# Patient Record
Sex: MALE | Race: WHITE | HISPANIC OR LATINO
[De-identification: names, ages, dates, MRNs, and addresses within clinical notes are randomized per-mention and may not be internally consistent; named-entity substitution may affect disease eponyms.]

---

## 2017-10-25 PROBLEM — Z00.00 ENCOUNTER FOR PREVENTIVE HEALTH EXAMINATION: Status: ACTIVE | Noted: 2017-10-25

## 2017-10-26 ENCOUNTER — APPOINTMENT (OUTPATIENT)
Dept: SURGERY | Facility: CLINIC | Age: 62
End: 2017-10-26
Payer: COMMERCIAL

## 2017-10-26 VITALS — BODY MASS INDEX: 37.77 KG/M2 | HEIGHT: 66 IN | WEIGHT: 235 LBS

## 2017-10-26 DIAGNOSIS — E66.09 OTHER OBESITY DUE TO EXCESS CALORIES: ICD-10-CM

## 2017-10-26 DIAGNOSIS — K40.90 UNILATERAL INGUINAL HERNIA, W/OUT OBSTRUCTION OR GANGRENE, NOT SPECIFIED AS RECURRENT: ICD-10-CM

## 2017-10-26 DIAGNOSIS — K42.9 UMBILICAL HERNIA W/OUT OBSTRUCTION OR GANGRENE: ICD-10-CM

## 2017-10-26 DIAGNOSIS — M62.08 SEPARATION OF MUSCLE (NONTRAUMATIC), OTHER SITE: ICD-10-CM

## 2017-10-26 PROCEDURE — 99243 OFF/OP CNSLTJ NEW/EST LOW 30: CPT

## 2018-11-24 ENCOUNTER — INPATIENT (INPATIENT)
Facility: HOSPITAL | Age: 63
LOS: 1 days | Discharge: HOME | End: 2018-11-26
Attending: HOSPITALIST | Admitting: HOSPITALIST
Payer: COMMERCIAL

## 2018-11-24 VITALS
TEMPERATURE: 98 F | HEART RATE: 109 BPM | RESPIRATION RATE: 18 BRPM | OXYGEN SATURATION: 98 % | SYSTOLIC BLOOD PRESSURE: 141 MMHG | DIASTOLIC BLOOD PRESSURE: 88 MMHG

## 2018-11-24 LAB
ALBUMIN SERPL ELPH-MCNC: 4.6 G/DL — SIGNIFICANT CHANGE UP (ref 3.5–5.2)
ALP SERPL-CCNC: 106 U/L — SIGNIFICANT CHANGE UP (ref 30–115)
ALT FLD-CCNC: 18 U/L — SIGNIFICANT CHANGE UP (ref 0–41)
ANION GAP SERPL CALC-SCNC: 15 MMOL/L — HIGH (ref 7–14)
APPEARANCE UR: CLEAR — SIGNIFICANT CHANGE UP
AST SERPL-CCNC: 19 U/L — SIGNIFICANT CHANGE UP (ref 0–41)
BASOPHILS # BLD AUTO: 0.02 K/UL — SIGNIFICANT CHANGE UP (ref 0–0.2)
BASOPHILS NFR BLD AUTO: 0.2 % — SIGNIFICANT CHANGE UP (ref 0–1)
BILIRUB DIRECT SERPL-MCNC: <0.2 MG/DL — SIGNIFICANT CHANGE UP (ref 0–0.2)
BILIRUB INDIRECT FLD-MCNC: >0.1 MG/DL — LOW (ref 0.2–1.2)
BILIRUB SERPL-MCNC: 0.3 MG/DL — SIGNIFICANT CHANGE UP (ref 0.2–1.2)
BILIRUB UR-MCNC: NEGATIVE — SIGNIFICANT CHANGE UP
BUN SERPL-MCNC: 21 MG/DL — HIGH (ref 10–20)
CALCIUM SERPL-MCNC: 9.9 MG/DL — SIGNIFICANT CHANGE UP (ref 8.5–10.1)
CHLORIDE SERPL-SCNC: 97 MMOL/L — LOW (ref 98–110)
CO2 SERPL-SCNC: 26 MMOL/L — SIGNIFICANT CHANGE UP (ref 17–32)
COLOR SPEC: YELLOW — SIGNIFICANT CHANGE UP
CREAT SERPL-MCNC: 0.9 MG/DL — SIGNIFICANT CHANGE UP (ref 0.7–1.5)
DIFF PNL FLD: NEGATIVE — SIGNIFICANT CHANGE UP
EOSINOPHIL # BLD AUTO: 0.23 K/UL — SIGNIFICANT CHANGE UP (ref 0–0.7)
EOSINOPHIL NFR BLD AUTO: 2.6 % — SIGNIFICANT CHANGE UP (ref 0–8)
GLUCOSE SERPL-MCNC: 102 MG/DL — HIGH (ref 70–99)
GLUCOSE UR QL: NEGATIVE — SIGNIFICANT CHANGE UP
HCT VFR BLD CALC: 40.4 % — LOW (ref 42–52)
HGB BLD-MCNC: 13.7 G/DL — LOW (ref 14–18)
IMM GRANULOCYTES NFR BLD AUTO: 0.5 % — HIGH (ref 0.1–0.3)
KETONES UR-MCNC: NEGATIVE — SIGNIFICANT CHANGE UP
LACTATE SERPL-SCNC: 1.1 MMOL/L — SIGNIFICANT CHANGE UP (ref 0.5–2.2)
LEUKOCYTE ESTERASE UR-ACNC: NEGATIVE — SIGNIFICANT CHANGE UP
LIDOCAIN IGE QN: 21 U/L — SIGNIFICANT CHANGE UP (ref 7–60)
LYMPHOCYTES # BLD AUTO: 2.06 K/UL — SIGNIFICANT CHANGE UP (ref 1.2–3.4)
LYMPHOCYTES # BLD AUTO: 23.7 % — SIGNIFICANT CHANGE UP (ref 20.5–51.1)
MAGNESIUM SERPL-MCNC: 1.9 MG/DL — SIGNIFICANT CHANGE UP (ref 1.8–2.4)
MCHC RBC-ENTMCNC: 27.9 PG — SIGNIFICANT CHANGE UP (ref 27–31)
MCHC RBC-ENTMCNC: 33.9 G/DL — SIGNIFICANT CHANGE UP (ref 32–37)
MCV RBC AUTO: 82.3 FL — SIGNIFICANT CHANGE UP (ref 80–94)
MONOCYTES # BLD AUTO: 0.47 K/UL — SIGNIFICANT CHANGE UP (ref 0.1–0.6)
MONOCYTES NFR BLD AUTO: 5.4 % — SIGNIFICANT CHANGE UP (ref 1.7–9.3)
NEUTROPHILS # BLD AUTO: 5.86 K/UL — SIGNIFICANT CHANGE UP (ref 1.4–6.5)
NEUTROPHILS NFR BLD AUTO: 67.6 % — SIGNIFICANT CHANGE UP (ref 42.2–75.2)
NITRITE UR-MCNC: NEGATIVE — SIGNIFICANT CHANGE UP
NRBC # BLD: 0 /100 WBCS — SIGNIFICANT CHANGE UP (ref 0–0)
PH UR: 5.5 — SIGNIFICANT CHANGE UP (ref 5–8)
PLATELET # BLD AUTO: 261 K/UL — SIGNIFICANT CHANGE UP (ref 130–400)
POTASSIUM SERPL-MCNC: 4.5 MMOL/L — SIGNIFICANT CHANGE UP (ref 3.5–5)
POTASSIUM SERPL-SCNC: 4.5 MMOL/L — SIGNIFICANT CHANGE UP (ref 3.5–5)
PROT SERPL-MCNC: 7.8 G/DL — SIGNIFICANT CHANGE UP (ref 6–8)
PROT UR-MCNC: ABNORMAL
RBC # BLD: 4.91 M/UL — SIGNIFICANT CHANGE UP (ref 4.7–6.1)
RBC # FLD: 12.6 % — SIGNIFICANT CHANGE UP (ref 11.5–14.5)
SODIUM SERPL-SCNC: 138 MMOL/L — SIGNIFICANT CHANGE UP (ref 135–146)
SP GR SPEC: >=1.03 — SIGNIFICANT CHANGE UP (ref 1.01–1.03)
TROPONIN T SERPL-MCNC: <0.01 NG/ML — SIGNIFICANT CHANGE UP
UROBILINOGEN FLD QL: 0.2 — SIGNIFICANT CHANGE UP (ref 0.2–0.2)
WBC # BLD: 8.68 K/UL — SIGNIFICANT CHANGE UP (ref 4.8–10.8)
WBC # FLD AUTO: 8.68 K/UL — SIGNIFICANT CHANGE UP (ref 4.8–10.8)

## 2018-11-24 RX ORDER — KETOROLAC TROMETHAMINE 30 MG/ML
30 SYRINGE (ML) INJECTION ONCE
Qty: 0 | Refills: 0 | Status: DISCONTINUED | OUTPATIENT
Start: 2018-11-24 | End: 2018-11-24

## 2018-11-24 RX ORDER — METHOCARBAMOL 500 MG/1
2 TABLET, FILM COATED ORAL
Qty: 20 | Refills: 0 | OUTPATIENT
Start: 2018-11-24

## 2018-11-24 RX ORDER — METHOCARBAMOL 500 MG/1
1000 TABLET, FILM COATED ORAL ONCE
Qty: 0 | Refills: 0 | Status: COMPLETED | OUTPATIENT
Start: 2018-11-24 | End: 2018-11-24

## 2018-11-24 RX ADMIN — Medication 30 MILLIGRAM(S): at 20:02

## 2018-11-24 RX ADMIN — METHOCARBAMOL 1000 MILLIGRAM(S): 500 TABLET, FILM COATED ORAL at 20:02

## 2018-11-24 RX ADMIN — Medication 30 MILLIGRAM(S): at 20:30

## 2018-11-24 NOTE — ED PROVIDER NOTE - ATTENDING CONTRIBUTION TO CARE
62 y/o male with h/o CAD s/p stent, HLD in ER with c/o R sided abd pain.  Pain has been intermittent for past 2 years, but worse for past 4-6 weeks.  Pain is sharp, comes in spasms, resolves completely in between.  Pain is to R side of abdomen, R flank, at times also R testicle.  denies any testicular pain/swelling at this time.  no penile discharge.  not currently sexually active.  no dysuria/hematuria.  no n/v/d.  no f/c. no cp/sob.  no ha/dizziness/loc.    pe - nad, nc/at, eomi, perrl, op - clear, cta b/l, no w/r/r, rrr, abd - soft, + r sided abd tenderness, no guarding/rebound, no cvat, uncirc male, no penile/testicular swelling or tenderness, from x 4, A&O x 3, no focal neuro deficits.  -check labs, ua, ct abd, testicular sono, re-eval.

## 2018-11-24 NOTE — ED PROVIDER NOTE - PHYSICAL EXAMINATION
VITAL SIGNS: I have reviewed nursing notes and confirm.  CONSTITUTIONAL: Well-developed; well-nourished; +very uncomfortable, sweating. sitting down in one position with legs crossed.  SKIN: skin exam is warm and dry, no acute rash.   HEAD: Normocephalic; atraumatic.  EYES:  EOM intact; conjunctiva and sclera clear.  ENT: No nasal discharge; airway clear. moist oral mucosa;   NECK: Supple; non tender.  CARD: S1, S2 normal; no murmurs, gallops, or rubs. Regular rate and rhythm. posterior tibial and radial pulses 2+  RESP: No wheezes, rales or rhonchi. cta b/l. no use of accessory muscles. no retractions  ABD: Normal bowel sounds; soft; non-distended; +epigastric tender; no rebound. negative psoas, rovsign's and murphys.  EXT: Normal ROM. No  cyanosis or edema.  BACK: +r.  cva tenderness  LYMPH: No acute cervical adenopathy.  NEURO: Alert, oriented, grossly unremarkable.    PSYCH: Cooperative, appropriate. VITAL SIGNS: I have reviewed nursing notes and confirm.  CONSTITUTIONAL: Well-developed; well-nourished; +very uncomfortable, sweating. sitting down in one position with legs crossed.  SKIN: skin exam is warm and dry, no acute rash.   HEAD: Normocephalic; atraumatic.  EYES:  EOM intact; conjunctiva and sclera clear.  ENT: No nasal discharge; airway clear. moist oral mucosa;   NECK: Supple; non tender.  CARD: S1, S2 normal; no murmurs, gallops, or rubs. Regular rate and rhythm. posterior tibial and radial pulses 2+  RESP: No wheezes, rales or rhonchi. cta b/l. no use of accessory muscles. no retractions  ABD: Normal bowel sounds; soft; non-distended; +epigastric tender; no rebound. negative psoas, rovsign's and murphys.  : no testicular tenderness or swelling +cremaster reflex b/l  EXT: Normal ROM. No  cyanosis or edema.  BACK: +r.  cva tenderness  LYMPH: No acute cervical adenopathy.  NEURO: Alert, oriented, grossly unremarkable.    PSYCH: Cooperative, appropriate.

## 2018-11-24 NOTE — ED PROVIDER NOTE - MEDICAL DECISION MAKING DETAILS
pt in ER with c/o 4-6 week h/o R back pain, R abd pain, occasional R testicular pain. labs ok.  CT scan with multifocal osseous lytic lesions, partial pathologic destruction of T10 vertebral body c/w metastatic disease.  pt with no vertebral tenderness on pe, no neuro deficits.  neurosurg called to see pt.  pt admitted for onc w/u.

## 2018-11-24 NOTE — ED PROVIDER NOTE - PROGRESS NOTE DETAILS
Pt reports feeling improvement, but not total resolution of symptoms with toradol and robaxin.  waiting on imaging Spoke with pt about CT results--- and lytic lesions.  Pt understand.  Pt will be admitted for work up which includes neurosurg for eval, oncology and search for primary source of malignancy. neurosurg recommending MRI.  MAR notified

## 2018-11-24 NOTE — ED ADULT NURSE NOTE - NSIMPLEMENTINTERV_GEN_ALL_ED
Implemented All Universal Safety Interventions:  [place] to call system. Call bell, personal items and telephone within reach. Instruct patient to call for assistance. Room bathroom lighting operational. Non-slip footwear when patient is off stretcher. Physically safe environment: no spills, clutter or unnecessary equipment. Stretcher in lowest position, wheels locked, appropriate side rails in place.

## 2018-11-24 NOTE — ED PROVIDER NOTE - OBJECTIVE STATEMENT
62y/o M w/ hx of cholesterol, CAD (2stents) and on 64y/o M w/ hx of cholesterol, CAD (2stents) and on and off LLQ pain since 2016 which has not been figured it out (colonoscopy 8/2017 at [facility] [address]- no abnormalities although "the end was not visualized" presents for worsening of stabbing L flank pain, L. back pain, LUQ pain and L. testicular pain that has been there for 4-6 weeks-- constant but changing in intensity throughout-- only day it went away was earlier today when he was sitting not moving-- but he moved and came back up.  pain improves mildly when sitting and crossing his left leg on top of r. leg. pt has been trying icey hot.  no blood in urine, no hx of kidney stones, no cough, congestion, runny nose, no vomiting.  no sob or cp. no vomiting or diarrhea. no dysuria or freq. no fevers or chills. 62y/o M w/ hx of cholesterol, CAD (2stents) and on and off LLQ pain since 2016 which has not been figured it out (colonoscopy 8/2017 at [facility] [address]- no abnormalities although "the end was not visualized" presents for worsening of stabbing R flank pain, R. back pain, RUQ pain and R. testicular pain that has been there for 4-6 weeks-- constant but changing in intensity throughout-- only day it went away was earlier today when he was sitting not moving-- but he moved and came back up.  pain improves mildly when sitting and crossing his left leg on top of r. leg. pt has been trying icey hot.  no blood in urine, no hx of kidney stones, no cough, congestion, runny nose, no vomiting.  no sob or cp. no vomiting or diarrhea. no dysuria or freq. no fevers or chills.

## 2018-11-25 LAB
ALBUMIN SERPL ELPH-MCNC: 4.4 G/DL — SIGNIFICANT CHANGE UP (ref 3.5–5.2)
ALP SERPL-CCNC: 93 U/L — SIGNIFICANT CHANGE UP (ref 30–115)
ALT FLD-CCNC: 19 U/L — SIGNIFICANT CHANGE UP (ref 0–41)
ANION GAP SERPL CALC-SCNC: 14 MMOL/L — SIGNIFICANT CHANGE UP (ref 7–14)
AST SERPL-CCNC: 19 U/L — SIGNIFICANT CHANGE UP (ref 0–41)
BASOPHILS # BLD AUTO: 0.03 K/UL — SIGNIFICANT CHANGE UP (ref 0–0.2)
BASOPHILS NFR BLD AUTO: 0.3 % — SIGNIFICANT CHANGE UP (ref 0–1)
BILIRUB SERPL-MCNC: 0.4 MG/DL — SIGNIFICANT CHANGE UP (ref 0.2–1.2)
BUN SERPL-MCNC: 22 MG/DL — HIGH (ref 10–20)
CALCIUM SERPL-MCNC: 9.8 MG/DL — SIGNIFICANT CHANGE UP (ref 8.5–10.1)
CHLORIDE SERPL-SCNC: 98 MMOL/L — SIGNIFICANT CHANGE UP (ref 98–110)
CO2 SERPL-SCNC: 27 MMOL/L — SIGNIFICANT CHANGE UP (ref 17–32)
CREAT SERPL-MCNC: 0.9 MG/DL — SIGNIFICANT CHANGE UP (ref 0.7–1.5)
EOSINOPHIL # BLD AUTO: 0.25 K/UL — SIGNIFICANT CHANGE UP (ref 0–0.7)
EOSINOPHIL NFR BLD AUTO: 2.9 % — SIGNIFICANT CHANGE UP (ref 0–8)
GLUCOSE SERPL-MCNC: 91 MG/DL — SIGNIFICANT CHANGE UP (ref 70–99)
HCT VFR BLD CALC: 41 % — LOW (ref 42–52)
HGB BLD-MCNC: 13.7 G/DL — LOW (ref 14–18)
IMM GRANULOCYTES NFR BLD AUTO: 0.3 % — SIGNIFICANT CHANGE UP (ref 0.1–0.3)
LYMPHOCYTES # BLD AUTO: 3.26 K/UL — SIGNIFICANT CHANGE UP (ref 1.2–3.4)
LYMPHOCYTES # BLD AUTO: 37.6 % — SIGNIFICANT CHANGE UP (ref 20.5–51.1)
MAGNESIUM SERPL-MCNC: 2.1 MG/DL — SIGNIFICANT CHANGE UP (ref 1.8–2.4)
MCHC RBC-ENTMCNC: 27.6 PG — SIGNIFICANT CHANGE UP (ref 27–31)
MCHC RBC-ENTMCNC: 33.4 G/DL — SIGNIFICANT CHANGE UP (ref 32–37)
MCV RBC AUTO: 82.7 FL — SIGNIFICANT CHANGE UP (ref 80–94)
MONOCYTES # BLD AUTO: 0.58 K/UL — SIGNIFICANT CHANGE UP (ref 0.1–0.6)
MONOCYTES NFR BLD AUTO: 6.7 % — SIGNIFICANT CHANGE UP (ref 1.7–9.3)
NEUTROPHILS # BLD AUTO: 4.52 K/UL — SIGNIFICANT CHANGE UP (ref 1.4–6.5)
NEUTROPHILS NFR BLD AUTO: 52.2 % — SIGNIFICANT CHANGE UP (ref 42.2–75.2)
PLATELET # BLD AUTO: 253 K/UL — SIGNIFICANT CHANGE UP (ref 130–400)
POTASSIUM SERPL-MCNC: 4.4 MMOL/L — SIGNIFICANT CHANGE UP (ref 3.5–5)
POTASSIUM SERPL-SCNC: 4.4 MMOL/L — SIGNIFICANT CHANGE UP (ref 3.5–5)
PROT SERPL-MCNC: 7.6 G/DL — SIGNIFICANT CHANGE UP (ref 6–8)
RBC # BLD: 4.96 M/UL — SIGNIFICANT CHANGE UP (ref 4.7–6.1)
RBC # FLD: 12.8 % — SIGNIFICANT CHANGE UP (ref 11.5–14.5)
SODIUM SERPL-SCNC: 139 MMOL/L — SIGNIFICANT CHANGE UP (ref 135–146)
WBC # BLD: 8.67 K/UL — SIGNIFICANT CHANGE UP (ref 4.8–10.8)
WBC # FLD AUTO: 8.67 K/UL — SIGNIFICANT CHANGE UP (ref 4.8–10.8)

## 2018-11-25 RX ORDER — ALBUTEROL 90 UG/1
2 AEROSOL, METERED ORAL EVERY 6 HOURS
Qty: 0 | Refills: 0 | Status: DISCONTINUED | OUTPATIENT
Start: 2018-11-25 | End: 2018-11-26

## 2018-11-25 RX ORDER — ENOXAPARIN SODIUM 100 MG/ML
40 INJECTION SUBCUTANEOUS EVERY 24 HOURS
Qty: 0 | Refills: 0 | Status: DISCONTINUED | OUTPATIENT
Start: 2018-11-25 | End: 2018-11-26

## 2018-11-25 RX ORDER — ACETAMINOPHEN 500 MG
650 TABLET ORAL EVERY 4 HOURS
Qty: 0 | Refills: 0 | Status: DISCONTINUED | OUTPATIENT
Start: 2018-11-25 | End: 2018-11-26

## 2018-11-25 RX ORDER — KETOROLAC TROMETHAMINE 30 MG/ML
30 SYRINGE (ML) INJECTION EVERY 6 HOURS
Qty: 0 | Refills: 0 | Status: DISCONTINUED | OUTPATIENT
Start: 2018-11-25 | End: 2018-11-26

## 2018-11-25 RX ORDER — FOLIC ACID 0.8 MG
1 TABLET ORAL DAILY
Qty: 0 | Refills: 0 | Status: DISCONTINUED | OUTPATIENT
Start: 2018-11-25 | End: 2018-11-26

## 2018-11-25 RX ORDER — ATORVASTATIN CALCIUM 80 MG/1
0 TABLET, FILM COATED ORAL
Qty: 0 | Refills: 0 | COMMUNITY

## 2018-11-25 RX ORDER — ATORVASTATIN CALCIUM 80 MG/1
20 TABLET, FILM COATED ORAL AT BEDTIME
Qty: 0 | Refills: 0 | Status: DISCONTINUED | OUTPATIENT
Start: 2018-11-25 | End: 2018-11-26

## 2018-11-25 RX ORDER — ASPIRIN/CALCIUM CARB/MAGNESIUM 324 MG
0 TABLET ORAL
Qty: 0 | Refills: 0 | COMMUNITY

## 2018-11-25 RX ORDER — PANTOPRAZOLE SODIUM 20 MG/1
40 TABLET, DELAYED RELEASE ORAL
Qty: 0 | Refills: 0 | Status: DISCONTINUED | OUTPATIENT
Start: 2018-11-25 | End: 2018-11-26

## 2018-11-25 RX ORDER — THIAMINE MONONITRATE (VIT B1) 100 MG
100 TABLET ORAL DAILY
Qty: 0 | Refills: 0 | Status: DISCONTINUED | OUTPATIENT
Start: 2018-11-25 | End: 2018-11-26

## 2018-11-25 RX ORDER — ASPIRIN/CALCIUM CARB/MAGNESIUM 324 MG
81 TABLET ORAL DAILY
Qty: 0 | Refills: 0 | Status: DISCONTINUED | OUTPATIENT
Start: 2018-11-25 | End: 2018-11-26

## 2018-11-25 RX ADMIN — PANTOPRAZOLE SODIUM 40 MILLIGRAM(S): 20 TABLET, DELAYED RELEASE ORAL at 05:56

## 2018-11-25 RX ADMIN — ATORVASTATIN CALCIUM 20 MILLIGRAM(S): 80 TABLET, FILM COATED ORAL at 14:13

## 2018-11-25 RX ADMIN — Medication 30 MILLIGRAM(S): at 18:46

## 2018-11-25 RX ADMIN — Medication 30 MILLIGRAM(S): at 03:24

## 2018-11-25 RX ADMIN — Medication 30 MILLIGRAM(S): at 04:00

## 2018-11-25 RX ADMIN — Medication 81 MILLIGRAM(S): at 14:13

## 2018-11-25 NOTE — H&P ADULT - FAMILY HISTORY
Mother  Still living? Unknown  Family history of hyperlipidemia, Age at diagnosis: Age Unknown     Father  Still living? Unknown  Family history of coronary artery disease, Age at diagnosis: Age Unknown

## 2018-11-25 NOTE — H&P ADULT - ATTENDING COMMENTS
Patient seen and examined independently. I agree with the resident's note, physical exam, and plan except as below.  ROS: back pain n cp, sob, palp, cough, blood in urine or stool , wt loss  Vital Signs Last 24 Hrs  T(C): 36.4 (25 Nov 2018 07:10), Max: 36.7 (24 Nov 2018 18:23)  T(F): 97.5 (25 Nov 2018 07:10), Max: 98 (24 Nov 2018 18:23)  HR: 79 (25 Nov 2018 07:10) (79 - 109)  BP: 141/77 (25 Nov 2018 07:10) (141/77 - 141/88)  BP(mean): --  RR: 20 (25 Nov 2018 07:10) (18 - 20)  SpO2: 98% (25 Nov 2018 07:10) (98% - 98%)  PE  obese  nad  aaox3  [id number]  ctabl  softntnd+bs  no cce    #acute on chronic back pain - found to have osseus lytic lesions   check psa, spep/upep - will need metastatic workup and biopsy  +smoking hx 40 pyhx  no signs of etoh withdrawal  #T10 compression fracture - neurosurg consulted - pain control, dvt prophylaxis   pt would like to have initial workup done here and follow up rest as outpt - including biopsy  dispo to home depending on neurosurgery eval

## 2018-11-25 NOTE — H&P ADULT - NSHPSOCIALHISTORY_GEN_ALL_CORE
Substance Use (street drugs): (  ) never used  ( x ) other: Remote history of cocaine usage but quit many years ago. No reported IVDA    Tobacco Usage:  (   ) never smoked   ( x  ) former smoker   (   ) current smoker: 2ppd >40 years   Alcohol Usage: 10 20oz beers eery weekend

## 2018-11-25 NOTE — H&P ADULT - NSHPPHYSICALEXAM_GEN_ALL_CORE
GENERAL: NAD, obese, Non-toxic, stated age   HEAD:  Atraumatic, Normocephalic  EYES: EOMI, conjunctiva and sclera clear  CHEST/LUNG: Clear to auscultation bilaterally; No wheezing, rhonchi, or crackles  HEART: Regular rate and rhythm; s1, s2, No murmurs, rubs, or gallops  ABDOMEN: Soft, Nontender, Nondistended; Bowel sounds present, No rebound or guarding noted   EXTREMITIES:  No lower extremity edema or calf tenderness to palpation.  No clubbing or cyanosis  PSYCH: AAOx3  NEUROLOGY: non-focal  SKIN: No rashes or lesions

## 2018-11-25 NOTE — H&P ADULT - HISTORY OF PRESENT ILLNESS
63M with pmhx of CAD s/p Stent (~2013), and HLD, and COPD (stable, takes no medications) presents from home for back pain x 6 weeks. Patient states the back pain varies in intensity and location but noted in his mid back, lower back, right side, and radiating down to his right testicle. Patient states the pain was more pronounced at night and when laying down, different positions helped alleviate to the pain. Denied any urinary incontinence, falls, unintended weight loss, night sweats, family history of cancer, CP, SOB, palpitations, saddle anesthesia/numbness or weakness of the lower extremities.   Patient reports his last colo was ~Aug 2017 and was normal though there was a point which could not be well visualized. He states no other abnormalities were reported to him.   Patient does report a heavy smoking history, ~2ppd for 40 years though quit ~5 years ago. Additionally he has had sustain heavy alcohol usage over the last 20 years, currently down to ~10 22oz beers on weekends.  Last drink was 4 days PTP. No history of IV drug abuse     In the ED patient underwent CT a/p to assess etiology of back pain and was found to have compression fracture at T10 with multiple lytic bone lesions suspicious for metastatic cancer. Primary site of cancer is unclear from the imaging.

## 2018-11-25 NOTE — H&P ADULT - ASSESSMENT
63M with pmhx of CAD s/p Stent (~2013), and HLD, and COPD (stable, takes no medications) presents from home for back pain x 6 weeks.  Found to have multiple lytic bone lesions on CT scan concerning or malignancy    Back Pain 2/2 to lytic bone lesions, suspicious for Metastatic Cancer: Primary source is unclear at this time   Patient will require CT Chest with IV contrast to further assess involvement of metastatic disease and possible biopsy points. Order in one day (just had IV contrast)   Bone scan   Neuro Sx Consulted for compression fracture, though no evidence of cord compression at this time, will hold on steroids. May be possible for them to biopsy spinal lesions    Likely to require IR guided biopsy once imaging is complete   Hold off on Heme-Onc consult until preliminary work up is complete, ideally biopsy should be taken   Will need repeat cancer screening as out patient (colonoscopy), please attempt to get previous colo report from out patient doctors.   Ca levels are normal, will wait on cancer screening markers until imaging is done. No clear source at this time   Toradol and tylenol for back pain     EtOH abuse: CIWA is 0 at time of admission  Ativan PRN if needed    GI ppx: will start PPI given ASA, Toradol, and Lovenox   DVT ppx: Lovenox sq  Full Code   Activity As tolerated  Diet: Low fat 63M with pmhx of CAD s/p Stent (~2013), and HLD, and COPD (stable, takes no medications) presents from home for back pain x 6 weeks.  Found to have multiple lytic bone lesions on CT scan concerning or malignancy    Back Pain 2/2 to lytic bone lesions, suspicious for Metastatic Cancer: Primary source is unclear at this time   Patient will require CT Chest with IV contrast to further assess involvement of metastatic disease and possible biopsy points. Order in one day (just had IV contrast)   Bone scan   Neuro Sx Consulted for compression fracture, though no evidence of cord compression at this time, will hold on steroids. May be possible for them to biopsy spinal lesions    Likely to require IR guided biopsy once imaging is complete   Hold off on Heme-Onc consult until preliminary work up is complete, ideally biopsy should be taken   Will need repeat cancer screening as out patient (colonoscopy), please attempt to get previous colo report from out patient doctors.   Ca levels are normal, will wait on cancer screening markers until imaging is done. No clear source at this time   Toradol and tylenol for back pain     EtOH abuse: CIWA is 0 at time of admission  Ativan PRN if needed  Folate and thiamine     CAD s/p Stent:   Cont with ASA and Liptior  Unclear as to why the patient isn't on BB and ACE-I/ARB, follows with Dr. Pastor as an out patient    GI ppx: will start PPI given ASA, Toradol, and Lovenox   DVT ppx: Lovenox sq  Full Code   Activity As tolerated  Diet: Low fat

## 2018-11-25 NOTE — H&P ADULT - NSHPLABSRESULTS_GEN_ALL_CORE
13.7   8.68  )-----------( 261      ( 24 Nov 2018 20:00 )             40.4       11-24    138  |  97<L>  |  21<H>  ----------------------------<  102<H>  4.5   |  26  |  0.9    Ca    9.9      24 Nov 2018 20:00  Mg     1.9     11-24    TPro  7.8  /  Alb  4.6  /  TBili  0.3  /  DBili  <0.2  /  AST  19  /  ALT  18  /  AlkPhos  106  11-24              Urinalysis Basic - ( 24 Nov 2018 21:05 )    Color: Yellow / Appearance: Clear / SG: >=1.030 / pH: x  Gluc: x / Ketone: Negative  / Bili: Negative / Urobili: 0.2   Blood: x / Protein: Trace / Nitrite: Negative   Leuk Esterase: Negative / RBC: x / WBC x   Sq Epi: x / Non Sq Epi: x / Bacteria: x    Lactate Trend  11-24 @ 20:00 Lactate:1.1       CARDIAC MARKERS ( 24 Nov 2018 20:00 )  x     / <0.01 ng/mL / x     / x     / x

## 2018-11-26 ENCOUNTER — TRANSCRIPTION ENCOUNTER (OUTPATIENT)
Age: 63
End: 2018-11-26

## 2018-11-26 VITALS
DIASTOLIC BLOOD PRESSURE: 71 MMHG | RESPIRATION RATE: 18 BRPM | HEART RATE: 67 BPM | TEMPERATURE: 97 F | SYSTOLIC BLOOD PRESSURE: 116 MMHG

## 2018-11-26 LAB
CULTURE RESULTS: NO GROWTH — SIGNIFICANT CHANGE UP
SPECIMEN SOURCE: SIGNIFICANT CHANGE UP

## 2018-11-26 PROCEDURE — 99221 1ST HOSP IP/OBS SF/LOW 40: CPT

## 2018-11-26 RX ORDER — INFLUENZA VIRUS VACCINE 15; 15; 15; 15 UG/.5ML; UG/.5ML; UG/.5ML; UG/.5ML
0.5 SUSPENSION INTRAMUSCULAR ONCE
Qty: 0 | Refills: 0 | Status: COMPLETED | OUTPATIENT
Start: 2018-11-26 | End: 2018-11-26

## 2018-11-26 RX ORDER — PANTOPRAZOLE SODIUM 20 MG/1
1 TABLET, DELAYED RELEASE ORAL
Qty: 30 | Refills: 0 | OUTPATIENT
Start: 2018-11-26 | End: 2018-12-25

## 2018-11-26 RX ORDER — OXYCODONE AND ACETAMINOPHEN 5; 325 MG/1; MG/1
1 TABLET ORAL EVERY 4 HOURS
Qty: 0 | Refills: 0 | Status: DISCONTINUED | OUTPATIENT
Start: 2018-11-26 | End: 2018-11-26

## 2018-11-26 RX ADMIN — Medication 650 MILLIGRAM(S): at 03:15

## 2018-11-26 RX ADMIN — Medication 500 MILLIGRAM(S): at 14:45

## 2018-11-26 RX ADMIN — Medication 650 MILLIGRAM(S): at 19:43

## 2018-11-26 RX ADMIN — Medication 500 MILLIGRAM(S): at 14:01

## 2018-11-26 NOTE — DISCHARGE NOTE ADULT - CARE PLAN
Principal Discharge DX:	Back pain  Goal:	Follow up for further imaging to establish primary diagnosis and cause of back pain  Assessment and plan of treatment:	Follow up as outpatient for diagnosis and further direction and need for biopsy and further treatment.

## 2018-11-26 NOTE — DISCHARGE NOTE ADULT - PLAN OF CARE
Follow up for further imaging to establish primary diagnosis and cause of back pain Follow up as outpatient for diagnosis and further direction and need for biopsy and further treatment.

## 2018-11-26 NOTE — DISCHARGE NOTE ADULT - PROVIDER TOKENS
FREE:[LAST:[Lupe],FIRST:[Elizabeth],PHONE:[phone]],FAX:[(   )    -],ADDRESS:[[address] [place] [address]]]

## 2018-11-26 NOTE — PROVIDER CONTACT NOTE (OTHER) - ACTION/TREATMENT ORDERED:
as per md will cancel d/c for now; when pt returns from test if he wishes to be d/c'd md will re-order.

## 2018-11-26 NOTE — DISCHARGE NOTE ADULT - HOSPITAL COURSE
63M PMH CAD s/p Stent (2013), HLD, COPD (stable, takes no medications) presents from home for back pain x 6 weeks. Found to have multiple lytic bone lesions on CT scan concerning for malignancy  LBack Pain 2/2 to lytic bone lesions, suspicious for Metastatic Cancer: Primary source is unclear at this time   CT Chest with IV contrast done and not read.   Bone scan as outpatient   Will Require IR guided biopsy once imaging is complete - but pt wants to do this as outpt  Heme onc likely after tissue diagnosis   CEA, PSA, SPEP/UPEP (though doubt MM) All pending.   Back pain controlled by  naprosyn and PPI for GI protection and  percocet 5/325 po q4 prn severe pain     from: CT Abdomen and Pelvis w/ IV Cont (11.24.18 @ 22:55) >  IMPRESSION:      Multifocal osseous lytic lesions as described with associated partial   pathologic destruction of the T10 vertebral body consistent with   metastatic disease. The underlying etiology is uncertain.    Further evaluation with nuclear medicine bone scan to evaluate extent of   disease is recommended. Additionally contrast-enhanced MR imaging of the   thoracic spine may be of benefit.    Mild prostatomegaly.  CAD s/p Stent:   Cont with ASA and Liptior  follow up with Dr. Pastor as an out patient 63M PMH CAD s/p Stent (2013), HLD, COPD (stable, takes no medications) presents from home for back pain x 6 weeks. Found to have multiple lytic bone lesions on CT scan concerning for malignancy  LBack Pain 2/2 to lytic bone lesions, suspicious for Metastatic Cancer: Primary source is unclear at this time   CT Chest with IV contrast done and not read. Pt insisted on discharge prior to completion of workup and test results with strict follow up instruction with PCP Dr. Elizabeth Andrade  Bone scan as outpatient   Will Require IR guided biopsy once imaging is complete - but pt wants to do this as outpt  Heme onc likely after tissue diagnosis   CEA, PSA, SPEP/UPEP (though doubt MM) All pending.   Back pain controlled by  naprosyn and PPI for GI protection and  percocet 5/325 po q4 prn severe pain     from: CT Abdomen and Pelvis w/ IV Cont (11.24.18 @ 22:55) >  IMPRESSION:      Multifocal osseous lytic lesions as described with associated partial   pathologic destruction of the T10 vertebral body consistent with   metastatic disease. The underlying etiology is uncertain.    Further evaluation with nuclear medicine bone scan to evaluate extent of   disease is recommended. Additionally contrast-enhanced MR imaging of the   thoracic spine may be of benefit.    Mild prostatomegaly.  CAD s/p Stent:   Cont with ASA and Liptior  follow up with Dr. Pastor as an out patient

## 2018-11-26 NOTE — DISCHARGE NOTE ADULT - PATIENT PORTAL LINK FT
You can access the [facility] Portal, offered by [facility], by registering with the following website: [email or web address]

## 2018-11-26 NOTE — CONSULT NOTE ADULT - SUBJECTIVE AND OBJECTIVE BOX
HISTORY OF PRESENT ILLNESS: 63M with pmhx of CAD s/p Stent (~2013), and HLD, and COPD (stable, takes no medications) presents from home for back pain x 6 weeks. Patient states the back pain varies in intensity and location but noted in his mid back, lower back, right side, and radiating down to his right testicle. Patient states the pain was more pronounced at night and when laying down, different positions helped alleviate to the pain. Denied any urinary incontinence, falls, unintended weight loss, night sweats, family history of cancer, CP, SOB, palpitations, saddle anesthesia/numbness or weakness of the lower extremities.   Patient reports his last colo was ~Aug 2017 and was normal though there was a point which could not be well visualized. He states no other abnormalities were reported to him.   Patient does report a heavy smoking history, ~2ppd for 40 years though quit ~5 years ago. Additionally he has had sustain heavy alcohol usage over the last 20 years, currently down to ~10 22oz beers on weekends.  Last drink was 4 days PTP. No history of IV drug abuse     In the ED patient underwent CT a/p to assess etiology of back pain and was found to have compression fracture at T10 with multiple lytic bone lesions suspicious for metastatic cancer. Primary site of cancer is unclear from the imaging. No significant cord compression noted on CT 9though exam of the central canal is limited in this modality).    CT C/A/P with contrast significant only for mild enlargement of prostate, Lytic lesions in T10, T8, rib and iliac crest. No other lesions. MRI +/- gado of the thoracic spine pending, Nuclear med scan pending. Labs pending (PSA, MM etc). Pt is up and ambulating without difficulty. Per report pain is not too severe. Pt not in room on MDs attempt to see him this evening for consultation.     PAST MEDICAL & SURGICAL HISTORY:  Alcohol abuse  Stented coronary artery  COPD (chronic obstructive pulmonary disease)  CAD (coronary artery disease)  High cholesterol  No significant past surgical history    FAMILY HISTORY:  Family history of coronary artery disease (Father)  Family history of hyperlipidemia (Mother)      SOCIAL HISTORY:  Tobacco Use:  EtOH use:   Substance:    Allergies    No Known Allergies    Intolerances        REVIEW OF SYSTEMS  unable to obtain, pt not in room 	      MEDICATIONS:  Antibiotics:    Neuro:  acetaminophen   Tablet .. 650 milliGRAM(s) Oral every 4 hours PRN  naproxen 500 milliGRAM(s) Oral every 12 hours  oxyCODONE    5 mG/acetaminophen 325 mG 1 Tablet(s) Oral every 4 hours PRN    Anticoagulation:  aspirin enteric coated 81 milliGRAM(s) Oral daily  enoxaparin Injectable 40 milliGRAM(s) SubCutaneous every 24 hours    OTHER:  ALBUTerol    90 MICROgram(s) HFA Inhaler 2 Puff(s) Inhalation every 6 hours PRN  atorvastatin 20 milliGRAM(s) Oral at bedtime  pantoprazole    Tablet 40 milliGRAM(s) Oral before breakfast    IVF:      Vital Signs Last 24 Hrs  T(C): 36.2 (26 Nov 2018 14:18), Max: 36.2 (26 Nov 2018 14:18)  T(F): 97.2 (26 Nov 2018 14:18), Max: 97.2 (26 Nov 2018 14:18)  HR: 67 (26 Nov 2018 14:18) (67 - 99)  BP: 116/71 (26 Nov 2018 14:18) (116/71 - 165/83)  BP(mean): --  RR: 18 (26 Nov 2018 14:18) (18 - 18)  SpO2: --    PHYSICAL EXAM:  Per report pt with mild to moderate back pain. No focal neurologic deficits. Ambulating wihtout difficult (MD unable to examine pt as he was not in room on rounds)    LABS:                        13.7   8.67  )-----------( 253      ( 25 Nov 2018 08:22 )             41.0     11-25    139  |  98  |  22<H>  ----------------------------<  91  4.4   |  27  |  0.9    Ca    9.8      25 Nov 2018 08:22  Mg     2.1     11-25    TPro  7.6  /  Alb  4.4  /  TBili  0.4  /  DBili  x   /  AST  19  /  ALT  19  /  AlkPhos  93  11-25      Urinalysis Basic - ( 24 Nov 2018 21:05 )    Color: Yellow / Appearance: Clear / SG: >=1.030 / pH: x  Gluc: x / Ketone: Negative  / Bili: Negative / Urobili: 0.2   Blood: x / Protein: Trace / Nitrite: Negative   Leuk Esterase: Negative / RBC: x / WBC x   Sq Epi: x / Non Sq Epi: x / Bacteria: x      CULTURES:  Culture Results:   No growth (11-24 @ 21:05)      RADIOLOGY & ADDITIONAL STUDIES:    Assessment: Multiple klytic lesions in spine, ribs and lilac crest with associated T10 compression fracture. Pt with no known history of primary malignancy.      Plan:    Agree with current workup. Please obtain MRI +/- thoracic spine. No neurosurgical intervention indicated urgently in this pt without clinical evidence of cord compression. will continue to follow.

## 2018-11-26 NOTE — PROGRESS NOTE ADULT - SUBJECTIVE AND OBJECTIVE BOX
DAILY PROGRESS NOTE  ===========================================================  Patient Information:   Hospital Day:</KIKA BOUDREAUX  /  63y  /  Male  /b> 1d /  MRN#: [id number]  Working / Admitting Diagnosis:  1. Back pain 2/2 lytic lesions    |:::::::::::::::::::::::::::::| SUBJECTIVE |:::::::::::::::::::::::::::::::|  OVERNIGHT EVENTS:   No acute events noted. Back pain improved.   Denies chest pain / SOB / palpitations / Nausea / Vomiting / constipation / diarrhea or abdominal pain.   ROS otherwise negative.    Past Medical History:   Family history of coronary artery disease (Father)  Family history of hyperlipidemia (Mother)  Alcohol abuse  Stented coronary artery  COPD / CAD / High cholesterol    ALLERGIES:  No Known Allergies    HOME MEDICATIONS:  aspirin 81 mg oral delayed release tablet: 1 tab(s) orally once a day (25 Nov 2018 03:02)  Lipitor 20 mg oral tablet: 1 tab(s) orally once a day (25 Nov 2018 03:02)    |:::::::::::::::::::::::::::| OBJECTIVE |:::::::::::::::::::::::::::|    VITAL SIGNS: Last 24 Hours  T(C): 35.9 (26 Nov 2018 04:17), Max: 35.9 (26 Nov 2018 04:17)  T(F): 96.6 (26 Nov 2018 04:17), Max: 96.6 (26 Nov 2018 04:17)  HR: 99 (26 Nov 2018 04:17) (71 - 99)  BP: 165/83 (26 Nov 2018 04:17) (108/62 - 165/83)  RR: 18 (26 Nov 2018 04:17) (18 - 18)  SpO2: 98% (25 Nov 2018 15:27) (98% - 98%)    PHYSICAL EXAM:  GENERAL:   Awake, alert; NAD.  HEENT:  Head NC/AT; Conjunctivae pink, Sclera anicteric & non-injected; Oral mucosa moist.  NECK:   Supple.  CARDIO:   RRR; S1 & S2 audible  RESP:  No respiratory distress or accessory muscle use. CTAB  GI:   Soft/ NT/ND / No guarding; No rebound tenderness.  EXT:   Without any cyanosis, clubbing, rash, lesions or edema.     LAB RESULTS:                        13.7   8.67  )-----------( 253      ( 25 Nov 2018 08:22 )             41.0     11-25    139  |  98  |  22<H>  ----------------------------<  91  4.4   |  27  |  0.9    Ca    9.8      25 Nov 2018 08:22  Mg     2.1     11-25    TPro  7.6  /  Alb  4.4  /  TBili  0.4  /  DBili  x   /  AST  19  /  ALT  19  /  AlkPhos  93  11-25  Urinalysis Basic - ( 24 Nov 2018 21:05 )  Color: Yellow / Appearance: Clear / SG: >=1.030 / pH: x  Gluc: x / Ketone: Negative  / Bili: Negative / Urobili: 0.2   Blood: x / Protein: Trace / Nitrite: Negative   Leuk Esterase: Negative / RBC: x / WBC x   Sq Epi: x / Non Sq Epi: x / Bacteria: x  CARDIAC MARKERS ( 24 Nov 2018 20:00 )  x     / <0.01 ng/mL / x     / x     / x        RADIOLOGY:  < from: CT Abdomen and Pelvis w/ IV Cont (11.24.18 @ 22:55) > IMPRESSION:      Multifocal osseous lytic lesions as described with associated partial   pathologic destruction of the T10 vertebral body consistent with   metastatic disease. The underlying etiology is uncertain.    Further evaluation with nuclear medicine bone scan to evaluate extent of   disease is recommended. Additionally contrast-enhanced MR imaging of the   thoracic spine may be of benefit.  Mild prostatomegaly.    INPATIENT MEDICATIONS:  aspirin enteric coated 81 milliGRAM(s) Oral daily  atorvastatin 20 milliGRAM(s) Oral at bedtime  enoxaparin Injectable 40 milliGRAM(s) SubCutaneous every 24 hours  folic acid 1 milliGRAM(s) Oral daily  naproxen 500 milliGRAM(s) Oral every 12 hours  pantoprazole    Tablet 40 milliGRAM(s) Oral before breakfast  thiamine 100 milliGRAM(s) Oral daily    PRN MEDICATIONS  acetaminophen   Tablet .. 650 milliGRAM(s) Oral every 4 hours PRN  ALBUTerol    90 MICROgram(s) HFA Inhaler 2 Puff(s) Inhalation every 6 hours PRN    ------------------------------------------------------------------------------------------------------------

## 2018-11-26 NOTE — PROGRESS NOTE ADULT - SUBJECTIVE AND OBJECTIVE BOX
KIKA BOUDREAUX  63y  Male  ***My note supersedes ALL resident notes that I sign.  My corrections for their notes are in my note.***    I can be reached directly on [employer]. My office number is [phone]. My personal cell number is [phone].    PMD: Elizabeth Andrade    INTERVAL EVENTS: Here for f/u of pain. Pain is in mid-back to rt. No hip pain. Pt able to walk. Pt does NOT want to do bx as inpt, he wants to f/u as outpt. Pt agrees to CT chest as inpt. Reports had c-scope 8/2017 that was neg. Was a smoker in past (for 40 yrs).    T(F): 96.6 (11-26-18 @ 04:17), Max: 96.6 (11-26-18 @ 04:17)  HR: 99 (11-26-18 @ 04:17) (71 - 99)  BP: 165/83 (11-26-18 @ 04:17) (108/62 - 165/83)  RR: 18 (11-26-18 @ 04:17) (18 - 18)  SpO2: 98% (11-25-18 @ 15:27) (98% - 98%)    GENERAL:   Awake, alert; NAD.  HEENT:  WNL  NECK:   no nodes; thyroid nl  CARDIO:   RRR; S1 & S2 audible  RESP:  CTA b/l  GI:   Soft/ NT/ND / No guarding; No rebound tenderness.  EXT:   Without any cyanosis, clubbing, rash, lesions or edema.     LABS:                        13.7    (    82.7   8.67  )-----------( ---------      253      ( 25 Nov 2018 08:22 )             41.0    (    12.8     CARDIAC MARKERS ( 24 Nov 2018 20:00 )  x     / <0.01 ng/mL / x     / x     / x        Urinalysis Basic - ( 24 Nov 2018 21:05 )    Color: Yellow / Appearance: Clear / SG: >=1.030 / pH: x  Gluc: x / Ketone: Negative  / Bili: Negative / Urobili: 0.2   Blood: x / Protein: Trace / Nitrite: Negative   Leuk Esterase: Negative / RBC: x / WBC x   Sq Epi: x / Non Sq Epi: x / Bacteria: x    RADIOLOGY & ADDITIONAL TESTS:  < from: CT Abdomen and Pelvis w/ IV Cont (11.24.18 @ 22:55) >  IMPRESSION:        Multifocal osseous lytic lesions as described with associated partial   pathologic destruction of the T10 vertebral body consistent with   metastatic disease. The underlying etiology is uncertain.    Further evaluation with nuclear medicine bone scan to evaluate extent of   disease is recommended. Additionally contrast-enhanced MR imaging of the   thoracic spine may be of benefit.    Mild prostatomegaly.    < end of copied text >      < from: US Testicles (11.24.18 @ 21:28) >  IMPRESSION:    No testicular torsion or epididymoorchitis.    < end of copied text >      MEDICATIONS:    acetaminophen   Tablet .. 650 milliGRAM(s) Oral every 4 hours PRN  ALBUTerol    90 MICROgram(s) HFA Inhaler 2 Puff(s) Inhalation every 6 hours PRN  aspirin enteric coated 81 milliGRAM(s) Oral daily  atorvastatin 20 milliGRAM(s) Oral at bedtime  enoxaparin Injectable 40 milliGRAM(s) SubCutaneous every 24 hours  folic acid 1 milliGRAM(s) Oral daily  naproxen 500 milliGRAM(s) Oral every 12 hours  pantoprazole    Tablet 40 milliGRAM(s) Oral before breakfast  thiamine 100 milliGRAM(s) Oral daily

## 2018-11-26 NOTE — PROGRESS NOTE ADULT - ASSESSMENT
63M PMH CAD s/p Stent (2013), HLD, COPD (stable, takes no medications) presents from home for back pain x 6 weeks. Found to have multiple lytic bone lesions on CT scan concerning for malignancy    # Back Pain 2/2 to lytic bone lesions suspicious for Metastatic Cancer  # T10 Vertebral body likely pathological fx due to above  - Primary source is unclear at this time   - CT Chest with IV contrast today to identify primary or biopsy point. Pt had prior contrast load now >24 hours ago.   - Bone scan   - Neuro Sx Consulted for compression fracture - no evidence of cord compression at this time   - Hold off on Heme-Onc consult until preliminary work up is complete, ideally biopsy should be taken   - Naproxen for pain. Pt does not want Toradol or opiates for now.     # EtOH abuse: CIWA is 0 at time of admission / Ativan PRN if needed / Folate and thiamine   # CAD s/p Stent - Cont with ASA and Liptor. Not on BB or ARB. Follows with Dr. Pastor as an out patient  # Diet: Low fat   # GI PPx - (X)Protonix given NSAID therapy  # DVT PPx - (X) Lovenox 40 mg SubQ  # Activity -  (X) Increase as Tolerated  () OOB w/ assist  () Bed Rest  # Dispo -   Patient to be discharged when medically optimized.  # Code Status - (X) FULL    () DNR / DNI    (X) Discussed Case and Plan with the Medical Attending.  Please call Dr. Mascorro with any questions/ recommendations: Spectra #[id number]

## 2018-11-26 NOTE — DISCHARGE NOTE ADULT - CARE PROVIDER_API CALL
Elizabeth Andrade  [address] [place] [address]  Phone: [phone]  Fax: (       -

## 2018-11-26 NOTE — PROGRESS NOTE ADULT - ASSESSMENT
# Back Pain 2/2 to lytic bone lesions, suspicious for Metastatic Cancer: Primary source is unclear at this time   CT Chest with IV contrast   Bone scan   Neuro Sx Consult; will hold on steroids.   will require IR guided biopsy once imaging is complete - but pt wants to do this as outpt  Hold off on Heme-Onc consult until biopsy back - as outpt  check CEA, PSA, SPEP/UPEP (though doubt MM)  agree w/ naprosyn and PPI  add percocet 5/325 po q4 prn severe pain     # EtOH abuse: CIWA is 0 at time of admission  d/c Ativan and Folate and thiamine     # CAD s/p Stent:   Cont with ASA and Liptior  follow up with Dr. Pastor as an out patient    # GI ppx: start PPI     # DVT ppx: Lovenox sq    # Full Code     Dispo: will likely d/c pt after CT chest, since pt does NOT want to do bx as inpt at this time; control pain; anticipate d/c home herb

## 2018-11-26 NOTE — DISCHARGE NOTE ADULT - MEDICATION SUMMARY - MEDICATIONS TO TAKE
I will START or STAY ON the medications listed below when I get home from the hospital:    aspirin 81 mg oral delayed release tablet  -- 1 tab(s) by mouth once a day  -- Indication: For CAD (coronary artery disease)    naproxen 500 mg oral tablet  -- 1 tab(s) by mouth every 12 hours, As Needed -for severe pain   -- Indication: For PAIN    oxyCODONE-acetaminophen 5 mg-325 mg oral tablet  -- 1 tab(s) by mouth every 4 hours, As Needed -Severe Pain (7 - 10) - for severe pain MDD:6 Tabs Max daily. Try Naproxen First. BREAKTHROUGH ONLY  -- Indication: For BREAKTHROUGH PAIN    Lipitor 20 mg oral tablet  -- 1 tab(s) by mouth once a day  -- Indication: For DLD    pantoprazole 40 mg oral delayed release tablet  -- 1 tab(s) by mouth once a day (before a meal)  -- Indication: For GI protection with NSAID

## 2018-11-27 ENCOUNTER — TRANSCRIPTION ENCOUNTER (OUTPATIENT)
Age: 63
End: 2018-11-27

## 2018-11-27 LAB
% ALBUMIN: 53.8 % — SIGNIFICANT CHANGE UP
% ALPHA 1: 4.1 % — SIGNIFICANT CHANGE UP
% ALPHA 2: 11.2 % — SIGNIFICANT CHANGE UP
% BETA: 11 % — SIGNIFICANT CHANGE UP
% GAMMA, URINE: 34.7 % — SIGNIFICANT CHANGE UP
% GAMMA: 19.9 % — SIGNIFICANT CHANGE UP
ALBUMIN 24H MFR UR ELPH: 24.9 % — SIGNIFICANT CHANGE UP
ALBUMIN SERPL ELPH-MCNC: 3.9 G/DL — SIGNIFICANT CHANGE UP (ref 3.6–5.5)
ALBUMIN/GLOB SERPL ELPH: 1.1 RATIO — SIGNIFICANT CHANGE UP
ALPHA1 GLOB 24H MFR UR ELPH: 11.3 % — SIGNIFICANT CHANGE UP
ALPHA1 GLOB SERPL ELPH-MCNC: 0.3 G/DL — SIGNIFICANT CHANGE UP (ref 0.1–0.4)
ALPHA2 GLOB 24H MFR UR ELPH: 16 % — SIGNIFICANT CHANGE UP
ALPHA2 GLOB SERPL ELPH-MCNC: 0.8 G/DL — SIGNIFICANT CHANGE UP (ref 0.5–1)
B-GLOBULIN 24H MFR UR ELPH: 13.1 % — SIGNIFICANT CHANGE UP
B-GLOBULIN SERPL ELPH-MCNC: 0.8 G/DL — SIGNIFICANT CHANGE UP (ref 0.5–1)
CREATININE, URINE RESULT: 106 MG/DL — SIGNIFICANT CHANGE UP
GAMMA GLOBULIN: 1.5 G/DL — SIGNIFICANT CHANGE UP (ref 0.6–1.6)
INTERPRETATION 24H UR IFE-IMP: SIGNIFICANT CHANGE UP
M PROTEIN 24H UR ELPH-MRATE: SIGNIFICANT CHANGE UP
PROT ?TM UR-MCNC: 7 MG/DL — SIGNIFICANT CHANGE UP (ref 0–12)
PROT ?TM UR-MCNC: 7 MG/DL — SIGNIFICANT CHANGE UP (ref 0–12)
PROT PATTERN 24H UR ELPH-IMP: SIGNIFICANT CHANGE UP
PROT PATTERN SERPL ELPH-IMP: SIGNIFICANT CHANGE UP
PROT SERPL-MCNC: 7.3 G/DL — SIGNIFICANT CHANGE UP (ref 6–8.3)
PROT SERPL-MCNC: 7.3 G/DL — SIGNIFICANT CHANGE UP (ref 6–8.3)
PSA FLD-MCNC: 0.76 NG/ML — SIGNIFICANT CHANGE UP (ref 0–4)
PSA FREE FLD-MCNC: 0.2 NG/ML — SIGNIFICANT CHANGE UP
PSA FREE FLD-MCNC: 26.3 — SIGNIFICANT CHANGE UP
PSA FREE MFR FLD: 0.76 NG/ML — SIGNIFICANT CHANGE UP (ref 0–4)
TOTAL VOLUME - 24 HOUR: SIGNIFICANT CHANGE UP ML
URINE CREATININE CALCULATION: SIGNIFICANT CHANGE UP G/24 H (ref 1–2)

## 2018-11-28 DIAGNOSIS — Z28.21 IMMUNIZATION NOT CARRIED OUT BECAUSE OF PATIENT REFUSAL: ICD-10-CM

## 2018-11-28 DIAGNOSIS — E78.5 HYPERLIPIDEMIA, UNSPECIFIED: ICD-10-CM

## 2018-11-28 DIAGNOSIS — J44.9 CHRONIC OBSTRUCTIVE PULMONARY DISEASE, UNSPECIFIED: ICD-10-CM

## 2018-11-28 DIAGNOSIS — I25.10 ATHEROSCLEROTIC HEART DISEASE OF NATIVE CORONARY ARTERY WITHOUT ANGINA PECTORIS: ICD-10-CM

## 2018-11-28 DIAGNOSIS — Z87.891 PERSONAL HISTORY OF NICOTINE DEPENDENCE: ICD-10-CM

## 2018-11-28 DIAGNOSIS — Z95.5 PRESENCE OF CORONARY ANGIOPLASTY IMPLANT AND GRAFT: ICD-10-CM

## 2018-11-28 DIAGNOSIS — C79.9 SECONDARY MALIGNANT NEOPLASM OF UNSPECIFIED SITE: ICD-10-CM

## 2018-11-28 DIAGNOSIS — M89.8X0 OTHER SPECIFIED DISORDERS OF BONE, MULTIPLE SITES: ICD-10-CM

## 2018-11-28 DIAGNOSIS — Z79.82 LONG TERM (CURRENT) USE OF ASPIRIN: ICD-10-CM

## 2018-12-03 PROBLEM — E78.00 PURE HYPERCHOLESTEROLEMIA, UNSPECIFIED: Chronic | Status: ACTIVE | Noted: 2018-11-24

## 2018-12-03 PROBLEM — J44.9 CHRONIC OBSTRUCTIVE PULMONARY DISEASE, UNSPECIFIED: Chronic | Status: ACTIVE | Noted: 2018-11-25

## 2018-12-03 PROBLEM — F10.10 ALCOHOL ABUSE, UNCOMPLICATED: Chronic | Status: ACTIVE | Noted: 2018-11-25

## 2018-12-04 ENCOUNTER — OUTPATIENT (OUTPATIENT)
Dept: OUTPATIENT SERVICES | Facility: HOSPITAL | Age: 63
LOS: 1 days | Discharge: HOME | End: 2018-12-04

## 2018-12-04 DIAGNOSIS — C79.9 SECONDARY MALIGNANT NEOPLASM OF UNSPECIFIED SITE: ICD-10-CM

## 2018-12-10 ENCOUNTER — EMERGENCY (EMERGENCY)
Facility: HOSPITAL | Age: 63
LOS: 0 days | Discharge: HOME | End: 2018-12-10
Attending: EMERGENCY MEDICINE | Admitting: EMERGENCY MEDICINE

## 2018-12-10 VITALS
TEMPERATURE: 97 F | SYSTOLIC BLOOD PRESSURE: 164 MMHG | HEART RATE: 96 BPM | DIASTOLIC BLOOD PRESSURE: 74 MMHG | OXYGEN SATURATION: 98 % | HEIGHT: 66 IN | WEIGHT: 244.05 LBS | RESPIRATION RATE: 20 BRPM

## 2018-12-10 DIAGNOSIS — R10.9 UNSPECIFIED ABDOMINAL PAIN: ICD-10-CM

## 2018-12-10 DIAGNOSIS — I25.10 ATHEROSCLEROTIC HEART DISEASE OF NATIVE CORONARY ARTERY WITHOUT ANGINA PECTORIS: ICD-10-CM

## 2018-12-10 DIAGNOSIS — J44.9 CHRONIC OBSTRUCTIVE PULMONARY DISEASE, UNSPECIFIED: ICD-10-CM

## 2018-12-10 DIAGNOSIS — Z79.899 OTHER LONG TERM (CURRENT) DRUG THERAPY: ICD-10-CM

## 2018-12-10 DIAGNOSIS — Z79.82 LONG TERM (CURRENT) USE OF ASPIRIN: ICD-10-CM

## 2018-12-10 DIAGNOSIS — E78.5 HYPERLIPIDEMIA, UNSPECIFIED: ICD-10-CM

## 2018-12-10 DIAGNOSIS — R10.30 LOWER ABDOMINAL PAIN, UNSPECIFIED: ICD-10-CM

## 2018-12-10 DIAGNOSIS — Z95.5 PRESENCE OF CORONARY ANGIOPLASTY IMPLANT AND GRAFT: ICD-10-CM

## 2018-12-10 NOTE — ED PROVIDER NOTE - PROGRESS NOTE DETAILS
from: CT Abdomen and Pelvis w/ IV Cont (11.24.18 @ 22:55)  IMPRESSION:  "Multifocal osseous lytic lesions as described with associated partial   pathologic destruction of the T10 vertebral body consistent with   metastatic disease. The underlying etiology is uncertain. Further evaluation with nuclear medicine bone scan to evaluate extent of disease is recommended. Additionally contrast-enhanced MR imaging of the thoracic spine may be of benefit." mri  thoracic spine 12/5/18: 1. Multiple foci of bone marrow signal abnormality consistent with osseous metastatic disease. 2. Complete infiltration of the T10 vertebral body with mild (30%) pathologic fracture and ventral epidural soft tissue component measuring up to 5 mm in width producing severe spinal stenosis with mild spinal cord flattening. No evidence of associated spinal cord edema. 3. Additional lesions noted within T8, T9 and T11. " ct chest with Iv contrast 11/16:IMPRESSION: "1. Osteolytic lesions involving the T8 and T10 vertebral bodies. 2. No suspicious pulmonary mass or nodule.   3. Findings compatible with emphysema and coexisting interstitial lung   disease. " Patient given a copy of all his results from his admission. Discussed with patient to follow up with dee No. Discussed follow up with neurosurgeon. Discussed follow up with PMD. Patient understands and agrees with discharge plan bedside ultrasound with no ascites, free fluid, no abd pain with use of ultrasound.

## 2018-12-10 NOTE — ED PROVIDER NOTE - GASTROINTESTINAL, MLM
Abdomen soft, non-tender, non distended, no rebound, no rigidity, no guarding. No bulging of the abdominal wall

## 2018-12-10 NOTE — ED PROVIDER NOTE - MEDICAL DECISION MAKING DETAILS
pt aware of all imaging, with copy of results given from admission, understands all imaging that was done, has follow up this weekend as well for cx for possible biopsy, pain medications at home, understands signs and symptoms to return for, will follow up.

## 2018-12-10 NOTE — ED PROVIDER NOTE - ATTENDING CONTRIBUTION TO CARE
CAD s/p Stent (~2013), and HLD, and COPD (stable, takes no medications) A 62 y/o m w/ pmhx of CAD s/p Stent (~2013), and HLD, and COPD (stable, takes no medications), etoh use on weekends, recent diagnosis of osseous lytic lesions with pathologic fractures consistent with metastatic disease (please see progress notes for imaging results done as pt has mri of thoracic spine ct chest and abd and pelvis with iv contrast) as pt was admitted on 11/25 discharged on 11/26 with pmd presents with R sided lateral lower wall pain since being in hospital continued today. pt reports he followed up as outpt, and has appt this Wednesday for cx for biopsy to be done. pt wanted copy of results to make sure he also had ct abd and pelvis done when he came as he thought he only did chest and back. on pain medications that he was discussed with including percocet and naproxen, as well as muscle relaxant at night. pt reports pain worse when sitting up, better at rest. improved with pain medications. No fever, chills, n/v, cp,  pleuritic cp, sob, palpitations, diaphoresis, cough, ha/lh/dizziness, numbness/tingling, neck pain/ stiffness, abd pain, diarrhea, constipation, melena/brbpr, urinary symptoms, urinary or bowel incontinence, saddle paresthesias, trauma, weakness, edema, calf pain/swelling/erythema, sick contacts, recent travel or rash.  On Exam: Vital Signs: I have reviewed the initial vital signs. Constitutional: WDWN in nad. Sitting on stretcher speaking full sentences. Integumentary: No rash. ENT: MMM NECK: Supple, non-tender, no meningeal signs. Cardiovascular: RRR, radial pulses 2/4 b/l. No JVD. Respiratory: BS present b/l, ctabl, no wheezing or crackles, good resp effort and excursion, good air exchange,  no accessory muscle use, no stridor. Gastrointestinal: BS present throughout all 4 quadrants, soft, nd, nt, no rebound tenderness or guarding, no cvat. R lateral abdominal wall pain to palpation worse with bending forward, better at rest. (-) fierro's (-) rovsing (-) psoas (-) obturator. Musculoskeletal: FROM, no edema, no calf pain/swelling/erythema. Neurologic: AAOx3, motor 5/5 and sensation intact throughout upper and lowe ext, CN II-XII intact, No facial droop or slurring of speech. No focal deficits. No tremors.

## 2018-12-10 NOTE — ED PROVIDER NOTE - CARE PLAN
Assessment and plan of treatment:	Plan: Bedside ultrasound, copy of all results from previous admission printed and reviewed, outpt follow up with pmd, onolcogy, and at his appt this Wednesday. Principal Discharge DX:	Abdominal wall pain  Assessment and plan of treatment:	Plan: Bedside ultrasound, copy of all results from previous admission printed and reviewed, outpt follow up with pmd, onolcogy, and at his appt this Wednesday.

## 2018-12-10 NOTE — ED PROVIDER NOTE - OBJECTIVE STATEMENT
64 yo M with hx of CAD s/p stent 2013, HLD, COPD, ETOH use on the weekends, recent diagnosis of osseous lytic lesions with pathologic fractures consistent with met disease, pt was recently admitted on 1125 and had an extensive work up, patient presents today for evaluation of abdominal muscle pain, ongoing for 2 years, with associated with subjective "bulging of the abdominal wall". No fever, no chills, no headache, no nausea, no vomiting, no chest pain, no back pain, no SOB, no urinary symptoms, no bloody stools, no changes in appetite. Patient states he has an appointment this week to follow up with neurosurgeon for evaluation of the MRI. Patient denies any other symptoms.

## 2018-12-10 NOTE — ED PROVIDER NOTE - PLAN OF CARE
Plan: Bedside ultrasound, copy of all results from previous admission printed and reviewed, outpt follow up with pmd, onolcogy, and at his appt this Wednesday.

## 2018-12-10 NOTE — ED PROVIDER NOTE - NSFOLLOWUPINSTRUCTIONS_ED_ALL_ED_FT
Strain    A strain is a stretch or tear in one of the muscles in your body. This is caused by an injury to the area such as a twisting mechanism. Symptoms include pain, swelling, or bruising. Rest that area over the next several days and slowly resume activity when tolerated. Ice can help with swelling and pain.     SEEK IMMEDIATE MEDICAL CARE IF YOU HAVE ANY OF THE FOLLOWING SYMPTOMS: worsening pain, inability to move that body part, numbness or tingling.

## 2018-12-10 NOTE — ED ADULT NURSE NOTE - PMH
Alcohol abuse    CAD (coronary artery disease)    COPD (chronic obstructive pulmonary disease)    High cholesterol    Stented coronary artery

## 2018-12-10 NOTE — ED PROVIDER NOTE - CARE PROVIDER_API CALL
Fran No), Hematology; Internal Medicine; Medical Oncology  [address] [place] [address]  Phone: [phone]  Fax: [phone]

## 2018-12-11 ENCOUNTER — INBOUND DOCUMENT (OUTPATIENT)
Age: 63
End: 2018-12-11

## 2018-12-12 ENCOUNTER — APPOINTMENT (OUTPATIENT)
Age: 63
End: 2018-12-12
Payer: COMMERCIAL

## 2018-12-12 VITALS — WEIGHT: 244 LBS | HEIGHT: 66 IN | BODY MASS INDEX: 39.21 KG/M2

## 2018-12-12 DIAGNOSIS — S22.000A WEDGE COMPRESSION FRACTURE OF UNSPECIFIED THORACIC VERTEBRA, INITIAL ENCOUNTER FOR CLOSED FRACTURE: ICD-10-CM

## 2018-12-12 DIAGNOSIS — M54.9 DORSALGIA, UNSPECIFIED: ICD-10-CM

## 2018-12-12 DIAGNOSIS — Z78.9 OTHER SPECIFIED HEALTH STATUS: ICD-10-CM

## 2018-12-12 PROCEDURE — 99215 OFFICE O/P EST HI 40 MIN: CPT

## 2018-12-12 RX ORDER — ATORVASTATIN CALCIUM 20 MG/1
20 TABLET, FILM COATED ORAL
Qty: 90 | Refills: 0 | Status: ACTIVE | COMMUNITY
Start: 2018-02-27

## 2018-12-12 RX ORDER — NAPROXEN 500 MG/1
TABLET ORAL
Refills: 0 | Status: ACTIVE | COMMUNITY

## 2018-12-12 RX ORDER — PANTOPRAZOLE 40 MG/1
40 TABLET, DELAYED RELEASE ORAL
Qty: 30 | Refills: 0 | Status: ACTIVE | COMMUNITY
Start: 2018-11-26

## 2018-12-12 RX ORDER — METHOCARBAMOL 500 MG/1
500 TABLET, FILM COATED ORAL
Qty: 20 | Refills: 0 | Status: ACTIVE | COMMUNITY
Start: 2018-11-24

## 2018-12-13 ENCOUNTER — OUTPATIENT (OUTPATIENT)
Dept: OUTPATIENT SERVICES | Facility: HOSPITAL | Age: 63
LOS: 1 days | Discharge: HOME | End: 2018-12-13

## 2018-12-13 VITALS
TEMPERATURE: 97 F | RESPIRATION RATE: 16 BRPM | HEIGHT: 66 IN | SYSTOLIC BLOOD PRESSURE: 150 MMHG | WEIGHT: 246.92 LBS | DIASTOLIC BLOOD PRESSURE: 80 MMHG | OXYGEN SATURATION: 98 % | HEART RATE: 78 BPM

## 2018-12-13 DIAGNOSIS — Z01.818 ENCOUNTER FOR OTHER PREPROCEDURAL EXAMINATION: ICD-10-CM

## 2018-12-13 PROBLEM — M54.9 BACK PAIN: Status: ACTIVE | Noted: 2018-12-13

## 2018-12-13 LAB
APTT BLD: 33.3 SEC — SIGNIFICANT CHANGE UP (ref 27–39.2)
BLD GP AB SCN SERPL QL: SIGNIFICANT CHANGE UP
INR BLD: 1.01 RATIO — SIGNIFICANT CHANGE UP (ref 0.65–1.3)
PROTHROM AB SERPL-ACNC: 11.6 SEC — SIGNIFICANT CHANGE UP (ref 9.95–12.87)
TYPE + AB SCN PNL BLD: SIGNIFICANT CHANGE UP

## 2018-12-13 RX ORDER — DOCUSATE SODIUM 100 MG/1
100 CAPSULE ORAL
Qty: 10 | Refills: 0 | Status: ACTIVE | COMMUNITY
Start: 2018-12-13 | End: 1900-01-01

## 2018-12-13 NOTE — H&P PST ADULT - REASON FOR ADMISSION
62 Y/O MALE HERE FOR PRE-ADMISSION SURGICAL TESTING. PATIENT REPORTS HE STARTED TO HAVE BACK PAIN IN OCTOBER. PATIENT STATES W/U REVEALED SUSPICIOUS LESIONS TO THORACIC SPINE AS WELL AS A T-10 FX.  NOW FOR SCHEDULED PROCEDURE.

## 2018-12-13 NOTE — H&P PST ADULT - PMH
Alcohol abuse    CAD (coronary artery disease)    COPD (chronic obstructive pulmonary disease)    High cholesterol    Stented coronary artery  RCA X2

## 2018-12-13 NOTE — H&P PST ADULT - NSANTHOSAYNRD_GEN_A_CORE
No. DANIELITO screening performed.  STOP BANG Legend: 0-2 = LOW Risk; 3-4 = INTERMEDIATE Risk; 5-8 = HIGH Risk

## 2018-12-14 PROBLEM — Z95.5 PRESENCE OF CORONARY ANGIOPLASTY IMPLANT AND GRAFT: Chronic | Status: ACTIVE | Noted: 2018-11-25

## 2018-12-14 PROBLEM — I25.10 ATHEROSCLEROTIC HEART DISEASE OF NATIVE CORONARY ARTERY WITHOUT ANGINA PECTORIS: Chronic | Status: ACTIVE | Noted: 2018-11-25

## 2018-12-17 ENCOUNTER — RESULT REVIEW (OUTPATIENT)
Age: 63
End: 2018-12-17

## 2018-12-17 ENCOUNTER — OUTPATIENT (OUTPATIENT)
Dept: OUTPATIENT SERVICES | Facility: HOSPITAL | Age: 63
LOS: 1 days | Discharge: HOME | End: 2018-12-17
Payer: COMMERCIAL

## 2018-12-17 VITALS
TEMPERATURE: 98 F | SYSTOLIC BLOOD PRESSURE: 117 MMHG | HEIGHT: 66 IN | WEIGHT: 244.93 LBS | RESPIRATION RATE: 20 BRPM | DIASTOLIC BLOOD PRESSURE: 66 MMHG | HEART RATE: 101 BPM

## 2018-12-17 VITALS — RESPIRATION RATE: 17 BRPM | DIASTOLIC BLOOD PRESSURE: 87 MMHG | HEART RATE: 87 BPM | SYSTOLIC BLOOD PRESSURE: 132 MMHG

## 2018-12-17 DIAGNOSIS — Z87.891 PERSONAL HISTORY OF NICOTINE DEPENDENCE: ICD-10-CM

## 2018-12-17 DIAGNOSIS — Z79.82 LONG TERM (CURRENT) USE OF ASPIRIN: ICD-10-CM

## 2018-12-17 DIAGNOSIS — C80.1 MALIGNANT (PRIMARY) NEOPLASM, UNSPECIFIED: ICD-10-CM

## 2018-12-17 DIAGNOSIS — M48.54XA COLLAPSED VERTEBRA, NOT ELSEWHERE CLASSIFIED, THORACIC REGION, INITIAL ENCOUNTER FOR FRACTURE: ICD-10-CM

## 2018-12-17 DIAGNOSIS — I25.10 ATHEROSCLEROTIC HEART DISEASE OF NATIVE CORONARY ARTERY WITHOUT ANGINA PECTORIS: ICD-10-CM

## 2018-12-17 DIAGNOSIS — E78.00 PURE HYPERCHOLESTEROLEMIA, UNSPECIFIED: ICD-10-CM

## 2018-12-17 DIAGNOSIS — I25.2 OLD MYOCARDIAL INFARCTION: ICD-10-CM

## 2018-12-17 DIAGNOSIS — Z95.5 PRESENCE OF CORONARY ANGIOPLASTY IMPLANT AND GRAFT: ICD-10-CM

## 2018-12-17 DIAGNOSIS — J44.9 CHRONIC OBSTRUCTIVE PULMONARY DISEASE, UNSPECIFIED: ICD-10-CM

## 2018-12-17 DIAGNOSIS — M84.58XA PATHOLOGICAL FRACTURE IN NEOPLASTIC DISEASE, OTHER SPECIFIED SITE, INITIAL ENCOUNTER FOR FRACTURE: ICD-10-CM

## 2018-12-17 DIAGNOSIS — E66.09 OTHER OBESITY DUE TO EXCESS CALORIES: ICD-10-CM

## 2018-12-17 DIAGNOSIS — E78.2 MIXED HYPERLIPIDEMIA: ICD-10-CM

## 2018-12-17 DIAGNOSIS — C79.51 SECONDARY MALIGNANT NEOPLASM OF BONE: ICD-10-CM

## 2018-12-17 DIAGNOSIS — Z82.49 FAMILY HISTORY OF ISCHEMIC HEART DISEASE AND OTHER DISEASES OF THE CIRCULATORY SYSTEM: ICD-10-CM

## 2018-12-17 PROBLEM — S22.000A THORACIC COMPRESSION FRACTURE: Status: ACTIVE | Noted: 2018-12-17

## 2018-12-17 PROCEDURE — 20982 ABLATE BONE TUMOR(S) PERQ: CPT | Mod: 50

## 2018-12-17 PROCEDURE — 22513 PERQ VERTEBRAL AUGMENTATION: CPT

## 2018-12-17 RX ORDER — ASPIRIN/CALCIUM CARB/MAGNESIUM 324 MG
1 TABLET ORAL
Qty: 0 | Refills: 0 | COMMUNITY

## 2018-12-17 RX ORDER — ONDANSETRON 8 MG/1
4 TABLET, FILM COATED ORAL ONCE
Qty: 0 | Refills: 0 | Status: DISCONTINUED | OUTPATIENT
Start: 2018-12-17 | End: 2018-12-17

## 2018-12-17 RX ORDER — HYDROMORPHONE HYDROCHLORIDE 2 MG/ML
1 INJECTION INTRAMUSCULAR; INTRAVENOUS; SUBCUTANEOUS
Qty: 0 | Refills: 0 | Status: DISCONTINUED | OUTPATIENT
Start: 2018-12-17 | End: 2018-12-17

## 2018-12-17 RX ORDER — OXYCODONE AND ACETAMINOPHEN 5; 325 MG/1; MG/1
2 TABLET ORAL ONCE
Qty: 0 | Refills: 0 | Status: DISCONTINUED | OUTPATIENT
Start: 2018-12-17 | End: 2018-12-17

## 2018-12-17 RX ORDER — HYDROMORPHONE HYDROCHLORIDE 2 MG/ML
0.5 INJECTION INTRAMUSCULAR; INTRAVENOUS; SUBCUTANEOUS
Qty: 0 | Refills: 0 | Status: DISCONTINUED | OUTPATIENT
Start: 2018-12-17 | End: 2018-12-17

## 2018-12-17 RX ORDER — SODIUM CHLORIDE 9 MG/ML
1000 INJECTION, SOLUTION INTRAVENOUS
Qty: 0 | Refills: 0 | Status: DISCONTINUED | OUTPATIENT
Start: 2018-12-17 | End: 2018-12-17

## 2018-12-17 RX ADMIN — HYDROMORPHONE HYDROCHLORIDE 1 MILLIGRAM(S): 2 INJECTION INTRAMUSCULAR; INTRAVENOUS; SUBCUTANEOUS at 09:45

## 2018-12-17 RX ADMIN — HYDROMORPHONE HYDROCHLORIDE 1 MILLIGRAM(S): 2 INJECTION INTRAMUSCULAR; INTRAVENOUS; SUBCUTANEOUS at 09:59

## 2018-12-17 RX ADMIN — SODIUM CHLORIDE 100 MILLILITER(S): 9 INJECTION, SOLUTION INTRAVENOUS at 09:33

## 2018-12-17 NOTE — ASU DISCHARGE PLAN (ADULT/PEDIATRIC). - MEDICATION SUMMARY - MEDICATIONS TO TAKE
I will START or STAY ON the medications listed below when I get home from the hospital:    aspirin 81 mg oral delayed release tablet  -- 1 tab(s) by mouth once a day  -- Indication: For M84.68XA/34884,15293    Percocet 5/325 oral tablet  -- 1 tab(s) by mouth every 6 hours, As Needed pain  -- Indication: For pain    Lipitor 20 mg oral tablet  -- 1 tab(s) by mouth once a day  -- Indication: For M84.68XA/22513,76000

## 2018-12-17 NOTE — BRIEF OPERATIVE NOTE - PROCEDURE
<<-----Click on this checkbox to enter Procedure Thoracic kyphoplasty  12/17/2018  T10 kyphoplasty, bipsy, osteocool  Active  SMOTIVALA

## 2018-12-17 NOTE — BRIEF OPERATIVE NOTE - POST-OP DX
Thoracic compression fracture, closed, initial encounter  12/17/2018  T10 pathologic compression fracture, ?metastatic disease. unknown primary  Active  MotivKristin gardner

## 2018-12-17 NOTE — CHART NOTE - NSCHARTNOTEFT_GEN_A_CORE
[facility] ANESTHESIA ADMISSION NOTE      Procedure: Thoracic kyphoplasty: T10 kyphoplasty, bipsy, osteocool    Post op diagnosis:  Thoracic compression fracture, closed, initial encounter      ____  Intubated  TV:______       Rate: ______      FiO2: ______    ____  Patent Airway    ____  Full return of protective reflexes    _x___  Full recovery from anesthesia / back to baseline     Vitals:   T: 97.7          R:   10               BP: 150/75                 Sat:  99                 P: 96      Mental Status:  _x___ Awake   __x___ Alert   _____ Drowsy   _____ Sedated    Nausea/Vomiting:  _x___ NO  ______Yes,   See Post - Op Orders          Pain Scale (0-10):  __6___    Treatment: ____ None    _x___ See Post - Op/PCA Orders    Post - Operative Fluids:   ____ Oral   __x__ See Post - Op Orders    Plan: Discharge:   __x__Home       _____Floor     _____Critical Care    _____  Other:_________________  moving all four extremities  Comments:

## 2018-12-17 NOTE — BRIEF OPERATIVE NOTE - PRE-OP DX
Closed compression fracture of thoracic vertebra, initial encounter  12/17/2018  T10 pathologic compression fracture, ?metastatic disease. unknown primary  Active  Kristin Fletcher

## 2018-12-28 ENCOUNTER — OTHER (OUTPATIENT)
Age: 63
End: 2018-12-28

## 2018-12-28 LAB — SURGICAL PATHOLOGY STUDY: SIGNIFICANT CHANGE UP

## 2019-01-02 ENCOUNTER — APPOINTMENT (OUTPATIENT)
Dept: NEUROSURGERY | Facility: CLINIC | Age: 64
End: 2019-01-02
Payer: COMMERCIAL

## 2019-01-02 PROCEDURE — 99214 OFFICE O/P EST MOD 30 MIN: CPT

## 2019-01-03 ENCOUNTER — APPOINTMENT (OUTPATIENT)
Dept: HEMATOLOGY ONCOLOGY | Facility: CLINIC | Age: 64
End: 2019-01-03

## 2019-01-07 RX ORDER — DIAZEPAM 5 MG/1
5 TABLET ORAL EVERY 8 HOURS
Qty: 15 | Refills: 0 | Status: ACTIVE | COMMUNITY
Start: 2018-12-13 | End: 1900-01-01

## 2019-01-09 ENCOUNTER — EMERGENCY (EMERGENCY)
Facility: HOSPITAL | Age: 64
LOS: 0 days | Discharge: HOME | End: 2019-01-09
Attending: EMERGENCY MEDICINE | Admitting: EMERGENCY MEDICINE

## 2019-01-09 VITALS
TEMPERATURE: 97 F | SYSTOLIC BLOOD PRESSURE: 137 MMHG | RESPIRATION RATE: 18 BRPM | OXYGEN SATURATION: 97 % | DIASTOLIC BLOOD PRESSURE: 70 MMHG | WEIGHT: 244.93 LBS | HEART RATE: 93 BPM

## 2019-01-09 DIAGNOSIS — M54.9 DORSALGIA, UNSPECIFIED: ICD-10-CM

## 2019-01-09 DIAGNOSIS — Z79.82 LONG TERM (CURRENT) USE OF ASPIRIN: ICD-10-CM

## 2019-01-09 DIAGNOSIS — R10.31 RIGHT LOWER QUADRANT PAIN: ICD-10-CM

## 2019-01-09 DIAGNOSIS — Z95.5 PRESENCE OF CORONARY ANGIOPLASTY IMPLANT AND GRAFT: ICD-10-CM

## 2019-01-09 DIAGNOSIS — I25.10 ATHEROSCLEROTIC HEART DISEASE OF NATIVE CORONARY ARTERY WITHOUT ANGINA PECTORIS: ICD-10-CM

## 2019-01-09 DIAGNOSIS — J44.9 CHRONIC OBSTRUCTIVE PULMONARY DISEASE, UNSPECIFIED: ICD-10-CM

## 2019-01-09 DIAGNOSIS — Z79.891 LONG TERM (CURRENT) USE OF OPIATE ANALGESIC: ICD-10-CM

## 2019-01-09 DIAGNOSIS — M89.8X8 OTHER SPECIFIED DISORDERS OF BONE, OTHER SITE: ICD-10-CM

## 2019-01-09 DIAGNOSIS — R10.30 LOWER ABDOMINAL PAIN, UNSPECIFIED: ICD-10-CM

## 2019-01-09 DIAGNOSIS — Z87.891 PERSONAL HISTORY OF NICOTINE DEPENDENCE: ICD-10-CM

## 2019-01-09 DIAGNOSIS — R10.33 PERIUMBILICAL PAIN: ICD-10-CM

## 2019-01-09 DIAGNOSIS — E78.5 HYPERLIPIDEMIA, UNSPECIFIED: ICD-10-CM

## 2019-01-09 DIAGNOSIS — I10 ESSENTIAL (PRIMARY) HYPERTENSION: ICD-10-CM

## 2019-01-09 LAB
ALBUMIN SERPL ELPH-MCNC: 3.8 G/DL — SIGNIFICANT CHANGE UP (ref 3.5–5.2)
ALBUMIN SERPL ELPH-MCNC: 4.1 G/DL — SIGNIFICANT CHANGE UP (ref 3.5–5.2)
ALP SERPL-CCNC: 87 U/L — SIGNIFICANT CHANGE UP (ref 30–115)
ALP SERPL-CCNC: 93 U/L — SIGNIFICANT CHANGE UP (ref 30–115)
ALT FLD-CCNC: 30 U/L — SIGNIFICANT CHANGE UP (ref 0–41)
ALT FLD-CCNC: 33 U/L — SIGNIFICANT CHANGE UP (ref 0–41)
ANION GAP SERPL CALC-SCNC: 13 MMOL/L — SIGNIFICANT CHANGE UP (ref 7–14)
ANION GAP SERPL CALC-SCNC: 19 MMOL/L — HIGH (ref 7–14)
APTT BLD: 32.5 SEC — SIGNIFICANT CHANGE UP (ref 27–39.2)
AST SERPL-CCNC: 20 U/L — SIGNIFICANT CHANGE UP (ref 0–41)
AST SERPL-CCNC: 42 U/L — HIGH (ref 0–41)
BASE EXCESS BLDV CALC-SCNC: 1.5 MMOL/L — SIGNIFICANT CHANGE UP (ref -2–2)
BASOPHILS # BLD AUTO: 0.02 K/UL — SIGNIFICANT CHANGE UP (ref 0–0.2)
BASOPHILS NFR BLD AUTO: 0.3 % — SIGNIFICANT CHANGE UP (ref 0–1)
BILIRUB SERPL-MCNC: 0.3 MG/DL — SIGNIFICANT CHANGE UP (ref 0.2–1.2)
BILIRUB SERPL-MCNC: 0.3 MG/DL — SIGNIFICANT CHANGE UP (ref 0.2–1.2)
BUN SERPL-MCNC: 14 MG/DL — SIGNIFICANT CHANGE UP (ref 10–20)
BUN SERPL-MCNC: 14 MG/DL — SIGNIFICANT CHANGE UP (ref 10–20)
CA-I SERPL-SCNC: 1.2 MMOL/L — SIGNIFICANT CHANGE UP (ref 1.12–1.3)
CALCIUM SERPL-MCNC: 8.8 MG/DL — SIGNIFICANT CHANGE UP (ref 8.5–10.1)
CALCIUM SERPL-MCNC: 8.9 MG/DL — SIGNIFICANT CHANGE UP (ref 8.5–10.1)
CHLORIDE SERPL-SCNC: 94 MMOL/L — LOW (ref 98–110)
CHLORIDE SERPL-SCNC: 98 MMOL/L — SIGNIFICANT CHANGE UP (ref 98–110)
CO2 SERPL-SCNC: 21 MMOL/L — SIGNIFICANT CHANGE UP (ref 17–32)
CO2 SERPL-SCNC: 23 MMOL/L — SIGNIFICANT CHANGE UP (ref 17–32)
CREAT SERPL-MCNC: 0.7 MG/DL — SIGNIFICANT CHANGE UP (ref 0.7–1.5)
CREAT SERPL-MCNC: 0.8 MG/DL — SIGNIFICANT CHANGE UP (ref 0.7–1.5)
EOSINOPHIL # BLD AUTO: 0.26 K/UL — SIGNIFICANT CHANGE UP (ref 0–0.7)
EOSINOPHIL NFR BLD AUTO: 4.3 % — SIGNIFICANT CHANGE UP (ref 0–8)
GAS PNL BLDV: 137 MMOL/L — SIGNIFICANT CHANGE UP (ref 136–145)
GAS PNL BLDV: SIGNIFICANT CHANGE UP
GLUCOSE SERPL-MCNC: 125 MG/DL — HIGH (ref 70–99)
GLUCOSE SERPL-MCNC: 127 MG/DL — HIGH (ref 70–99)
HCO3 BLDV-SCNC: 27 MMOL/L — SIGNIFICANT CHANGE UP (ref 22–29)
HCT VFR BLD CALC: 36.5 % — LOW (ref 42–52)
HCT VFR BLDA CALC: 42.5 % — SIGNIFICANT CHANGE UP (ref 34–44)
HGB BLD CALC-MCNC: 13.9 G/DL — LOW (ref 14–18)
HGB BLD-MCNC: 12.2 G/DL — LOW (ref 14–18)
IMM GRANULOCYTES NFR BLD AUTO: 0.3 % — SIGNIFICANT CHANGE UP (ref 0.1–0.3)
INR BLD: 0.96 RATIO — SIGNIFICANT CHANGE UP (ref 0.65–1.3)
LACTATE BLDV-MCNC: 0.9 MMOL/L — SIGNIFICANT CHANGE UP (ref 0.5–1.6)
LIDOCAIN IGE QN: 21 U/L — SIGNIFICANT CHANGE UP (ref 7–60)
LYMPHOCYTES # BLD AUTO: 2.21 K/UL — SIGNIFICANT CHANGE UP (ref 1.2–3.4)
LYMPHOCYTES # BLD AUTO: 36.8 % — SIGNIFICANT CHANGE UP (ref 20.5–51.1)
MCHC RBC-ENTMCNC: 28 PG — SIGNIFICANT CHANGE UP (ref 27–31)
MCHC RBC-ENTMCNC: 33.4 G/DL — SIGNIFICANT CHANGE UP (ref 32–37)
MCV RBC AUTO: 83.9 FL — SIGNIFICANT CHANGE UP (ref 80–94)
MONOCYTES # BLD AUTO: 0.56 K/UL — SIGNIFICANT CHANGE UP (ref 0.1–0.6)
MONOCYTES NFR BLD AUTO: 9.3 % — SIGNIFICANT CHANGE UP (ref 1.7–9.3)
NEUTROPHILS # BLD AUTO: 2.93 K/UL — SIGNIFICANT CHANGE UP (ref 1.4–6.5)
NEUTROPHILS NFR BLD AUTO: 49 % — SIGNIFICANT CHANGE UP (ref 42.2–75.2)
NRBC # BLD: 0 /100 WBCS — SIGNIFICANT CHANGE UP (ref 0–0)
PCO2 BLDV: 45 MMHG — SIGNIFICANT CHANGE UP (ref 41–51)
PH BLDV: 7.39 — SIGNIFICANT CHANGE UP (ref 7.26–7.43)
PLATELET # BLD AUTO: 239 K/UL — SIGNIFICANT CHANGE UP (ref 130–400)
PO2 BLDV: 51 MMHG — HIGH (ref 20–40)
POTASSIUM BLDV-SCNC: 4 MMOL/L — SIGNIFICANT CHANGE UP (ref 3.3–5.6)
POTASSIUM SERPL-MCNC: 4.5 MMOL/L — SIGNIFICANT CHANGE UP (ref 3.5–5)
POTASSIUM SERPL-MCNC: 6.9 MMOL/L — CRITICAL HIGH (ref 3.5–5)
POTASSIUM SERPL-SCNC: 4.5 MMOL/L — SIGNIFICANT CHANGE UP (ref 3.5–5)
POTASSIUM SERPL-SCNC: 6.9 MMOL/L — CRITICAL HIGH (ref 3.5–5)
PROT SERPL-MCNC: 7.1 G/DL — SIGNIFICANT CHANGE UP (ref 6–8)
PROT SERPL-MCNC: 7.6 G/DL — SIGNIFICANT CHANGE UP (ref 6–8)
PROTHROM AB SERPL-ACNC: 11.1 SEC — SIGNIFICANT CHANGE UP (ref 9.95–12.87)
RBC # BLD: 4.35 M/UL — LOW (ref 4.7–6.1)
RBC # FLD: 13.2 % — SIGNIFICANT CHANGE UP (ref 11.5–14.5)
SAO2 % BLDV: 86 % — SIGNIFICANT CHANGE UP
SODIUM SERPL-SCNC: 134 MMOL/L — LOW (ref 135–146)
SODIUM SERPL-SCNC: 134 MMOL/L — LOW (ref 135–146)
WBC # BLD: 6 K/UL — SIGNIFICANT CHANGE UP (ref 4.8–10.8)
WBC # FLD AUTO: 6 K/UL — SIGNIFICANT CHANGE UP (ref 4.8–10.8)

## 2019-01-09 RX ORDER — SODIUM CHLORIDE 9 MG/ML
3 INJECTION INTRAMUSCULAR; INTRAVENOUS; SUBCUTANEOUS EVERY 8 HOURS
Qty: 0 | Refills: 0 | Status: DISCONTINUED | OUTPATIENT
Start: 2019-01-09 | End: 2019-01-09

## 2019-01-09 RX ORDER — CYCLOBENZAPRINE HYDROCHLORIDE 10 MG/1
1 TABLET, FILM COATED ORAL
Qty: 30 | Refills: 0 | OUTPATIENT
Start: 2019-01-09 | End: 2019-01-18

## 2019-01-09 RX ORDER — KETOROLAC TROMETHAMINE 30 MG/ML
30 SYRINGE (ML) INJECTION ONCE
Qty: 0 | Refills: 0 | Status: DISCONTINUED | OUTPATIENT
Start: 2019-01-09 | End: 2019-01-09

## 2019-01-09 RX ADMIN — Medication 30 MILLIGRAM(S): at 11:00

## 2019-01-09 NOTE — ED PROVIDER NOTE - CARE PLAN
Principal Discharge DX:	Abdominal pain  Secondary Diagnosis:	Back pain  Secondary Diagnosis:	Bony metastasis

## 2019-01-09 NOTE — ED PROVIDER NOTE - MEDICAL DECISION MAKING DETAILS
Evaluated for acute intraabdominal pathology due to groin pain. Negative workup for acute findings. Patient to be discharged from ED. Any available test results were discussed with patient and/or family. Verbal instructions given, including instructions to return to ED immediately for any new, worsening, or concerning symptoms. Patient endorsed understanding. Written discharge instructions additionally given, including follow-up plan.

## 2019-01-09 NOTE — ED PROVIDER NOTE - PROGRESS NOTE DETAILS
pt signed out-pending bloodwork and CT scan endorsed to Dr Deal pending labs, CT, urine, EKG I received signout. Pending labs, CT a/p, urine, EKG for w/u of abd pain. Received sign out from PAULINE Martinez, awaiting labs and CT. Potassium 6.9, will re-draw chem and VBG. No acute intra-abdominal pathology. Symptoms musculoskeletal, could be eminating from back. Patient provided with copies of testing and instructed to f/u with neurosurgeon. Patient requested we speak to his neurosurgeon, Dr. Fletcher, call placed, but will not hold pt here. Dr. Fletcher called back, recommended to offer patient admission for inpatient oncologic workup as patient has been a poor follow up and having difficulty outpatient obtaining onc appt. Patient has had difficiulty managing pain at home. Called patient back to relay information. He has an appt with oncology Jan 22 but will consider returning for admission.

## 2019-01-09 NOTE — ED PROVIDER NOTE - CARE PROVIDER_API CALL
Kristin Fletcher (MD), [facility]  [address] [place] [address]  [address] [place] [address]  Phone: [phone]  Fax: [phone]

## 2019-01-09 NOTE — ED ADULT NURSE REASSESSMENT NOTE - NS ED NURSE REASSESS COMMENT FT1
pt A&OX4. VSS. pt c/o of right groin pain. md is aware. pending CT scan. will continue to monitor and continue with plan of care.

## 2019-01-09 NOTE — ED PROVIDER NOTE - PHYSICAL EXAMINATION
VITAL SIGNS: I have reviewed nursing notes and confirm.  CONSTITUTIONAL: Well-developed; well-nourished; in no acute distress.  SKIN: Skin exam is warm and dry, <2 sec cap refill  HEAD: Normocephalic; atraumatic.  EYES: PERRL, EOM intact; normal conjunctiva.  ENT: MMM; airway clear.   NECK: Supple; non tender.  CARD: RRR, 2+ dp pulses  RESP: No wheezes, rales or rhonchi, speaking in full sentences  ABD: soft, RLQ/periumbilical TTP- no guarding or rebound tenderness  : normal male anatomy. no inguinal LAD. no penile discharge or testicular TTP  EXT: Normal ROM. No edema.  NEURO: Alert, oriented. Grossly unremarkable. No focal deficits.  PSYCH: Cooperative, appropriate.

## 2019-01-09 NOTE — ED ADULT NURSE NOTE - OBJECTIVE STATEMENT
c/o groin pain . As per patient it got worse in last 2 days .Patient is alert,oriented . H/o thoracic fracture noted

## 2019-01-09 NOTE — ED PROVIDER NOTE - NS ED ROS FT
Review of Systems:  CONSTITUTIONAL: no fever  EYES: no photophobia, no blurred vision  ENT: no ear pain, no nasal discharge  RESPIRATORY: no shortness of breath, no cough  CARDIAC: no chest pain, no palpitations  GI:+ abd pain, no nausea, no vomiting, no diarrhea, no constipation,   : no dysuria; no hematuria,   MUSCULOSKELETAL: no joint paint  NEUROLOGIC: no headache,   SKIN: no rashes

## 2019-01-09 NOTE — ED PROVIDER NOTE - ATTENDING CONTRIBUTION TO CARE
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 Pt states pain worsened today, worse with bending, and getting up from supine position. Denies radiation into back. Pt states pain to back since Oct/Nov (when admitted for it and initially found the metastatic osseous lesions) and he feels that still demetris when laying down. Pt denies saddle anesthesia, no incontinence, no ataxia. Pt denies N/V/D/F/C/dysuria/CP/SOB. However, he called the neurosurgery doctor to inform them of the abdomen pain and was advised to come to ER. Pt on exam has mild tenderness to the periumbilical area. NO palpable masses, nondistended, +obese, +BS. Groin, no inguinal hernia, no testicular pain, no penile dc, no lesions, uncircumcised. Will check labs, urine, CT scan and reassess. 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 Pt states pain worsened today, worse with bending, and getting up from supine position. Denies radiation into back. Pt states pain to back since Oct/Nov (when admitted for it and initially found the metastatic osseous lesions) and he feels that still demetris when laying down. Pt denies saddle anesthesia, no incontinence, no ataxia. Pt denies N/V/D/F/C/dysuria/CP/SOB. However, he called the neurosurgery doctor to inform them of the abdomen pain and was advised to come to ER. Pt on exam has mild tenderness to the periumbilical area. NO palpable masses, nondistended, +obese, +BS. Groin, no inguinal hernia, no testicular pain, no penile dc, no lesions, uncircumcised. Will check labs, urine, CT scan and reassess.    PMD Dr Elizabeth Andrade, Neurosurgery [facility]  Meds: ASA, Lipitor, Percocet, a muscle relaxer  ALL: nkda  SH: former smoker

## 2019-01-09 NOTE — ED PROVIDER NOTE - OBJECTIVE STATEMENT
64 y/o M h/o osteolytic lesions to thoracic spine (T10), COPD, HTN, HLD, CAD with 2 stents, presents with R sided abdomen pain that radiates to his groin. pt reports similar pain in december that resolved but states his pain returned again 2 days ago.pt states his pain exacerbated with bending, movement, and palpation. Pt was seen here in the ED  (when admitted for it and initially found the metastatic osseous lesions) and he feels that still demetris when laying down. Pt denies saddle anesthesia, no incontinence, no ataxia. Pt denies N/V/D/F/C/dysuria/CP/SOB. However, he called the neurosurgery doctor to inform them of the abdomen pain and was advised to come to ER. Pt on exam has mild tenderness to the periumbilical area. NO palpable masses, nondistended, +obese, +BS. Groin, no inguinal hernia, no testicular pain, no penile dc, no lesions, uncircumcised. Will check labs, urine, CT scan and reassess. 62 y/o M h/o osteolytic lesions to thoracic spine (T10), T10 compression fx s/p kyphoplasty, COPD, HTN, HLD, CAD with 2 stents, referred to the ED by his neurosurgeon for R sided abdomen pain that radiates to his groin. pt reports similar pain in december that resolved but states his pain returned again 2 days ago. pt states his pain exacerbated with bending, movement, and palpation. Pt was seen here in the ED  a few weeks ago and was admitted for metastatic osseous lesions. PT has had MRI's and PET scans within the past 3 months.  Denies urinary symptoms, saddle anesthesia, changes in gait, n/v, changes in BM. CP, SOB, testicular pain, penile discharge, rashes

## 2019-01-10 ENCOUNTER — INPATIENT (INPATIENT)
Facility: HOSPITAL | Age: 64
LOS: 0 days | Discharge: HOME | End: 2019-01-11
Attending: HOSPITALIST | Admitting: HOSPITALIST
Payer: COMMERCIAL

## 2019-01-10 VITALS
RESPIRATION RATE: 18 BRPM | SYSTOLIC BLOOD PRESSURE: 148 MMHG | OXYGEN SATURATION: 95 % | HEART RATE: 82 BPM | DIASTOLIC BLOOD PRESSURE: 71 MMHG | TEMPERATURE: 98 F

## 2019-01-10 DIAGNOSIS — C80.1 MALIGNANT (PRIMARY) NEOPLASM, UNSPECIFIED: ICD-10-CM

## 2019-01-10 LAB
ALBUMIN SERPL ELPH-MCNC: 4 G/DL — SIGNIFICANT CHANGE UP (ref 3.5–5.2)
ALP SERPL-CCNC: 94 U/L — SIGNIFICANT CHANGE UP (ref 30–115)
ALT FLD-CCNC: 29 U/L — SIGNIFICANT CHANGE UP (ref 0–41)
ANION GAP SERPL CALC-SCNC: 14 MMOL/L — SIGNIFICANT CHANGE UP (ref 7–14)
APPEARANCE UR: CLEAR — SIGNIFICANT CHANGE UP
AST SERPL-CCNC: 22 U/L — SIGNIFICANT CHANGE UP (ref 0–41)
BILIRUB SERPL-MCNC: 0.3 MG/DL — SIGNIFICANT CHANGE UP (ref 0.2–1.2)
BILIRUB UR-MCNC: ABNORMAL
BUN SERPL-MCNC: 19 MG/DL — SIGNIFICANT CHANGE UP (ref 10–20)
CALCIUM SERPL-MCNC: 9.4 MG/DL — SIGNIFICANT CHANGE UP (ref 8.5–10.1)
CHLORIDE SERPL-SCNC: 100 MMOL/L — SIGNIFICANT CHANGE UP (ref 98–110)
CO2 SERPL-SCNC: 25 MMOL/L — SIGNIFICANT CHANGE UP (ref 17–32)
COLOR SPEC: SIGNIFICANT CHANGE UP
CREAT SERPL-MCNC: 0.8 MG/DL — SIGNIFICANT CHANGE UP (ref 0.7–1.5)
DIFF PNL FLD: NEGATIVE — SIGNIFICANT CHANGE UP
GLUCOSE SERPL-MCNC: 98 MG/DL — SIGNIFICANT CHANGE UP (ref 70–99)
GLUCOSE UR QL: NEGATIVE — SIGNIFICANT CHANGE UP
HCT VFR BLD CALC: 38.3 % — LOW (ref 42–52)
HGB BLD-MCNC: 12.6 G/DL — LOW (ref 14–18)
KETONES UR-MCNC: ABNORMAL
LEUKOCYTE ESTERASE UR-ACNC: NEGATIVE — SIGNIFICANT CHANGE UP
MCHC RBC-ENTMCNC: 27.3 PG — SIGNIFICANT CHANGE UP (ref 27–31)
MCHC RBC-ENTMCNC: 32.9 G/DL — SIGNIFICANT CHANGE UP (ref 32–37)
MCV RBC AUTO: 82.9 FL — SIGNIFICANT CHANGE UP (ref 80–94)
NITRITE UR-MCNC: NEGATIVE — SIGNIFICANT CHANGE UP
NRBC # BLD: 0 /100 WBCS — SIGNIFICANT CHANGE UP (ref 0–0)
PH UR: 5.5 — SIGNIFICANT CHANGE UP (ref 5–8)
PLATELET # BLD AUTO: 233 K/UL — SIGNIFICANT CHANGE UP (ref 130–400)
POTASSIUM SERPL-MCNC: 4.6 MMOL/L — SIGNIFICANT CHANGE UP (ref 3.5–5)
POTASSIUM SERPL-SCNC: 4.6 MMOL/L — SIGNIFICANT CHANGE UP (ref 3.5–5)
PROT SERPL-MCNC: 7.6 G/DL — SIGNIFICANT CHANGE UP (ref 6–8)
PROT UR-MCNC: NEGATIVE — SIGNIFICANT CHANGE UP
RBC # BLD: 4.62 M/UL — LOW (ref 4.7–6.1)
RBC # FLD: 13.2 % — SIGNIFICANT CHANGE UP (ref 11.5–14.5)
SODIUM SERPL-SCNC: 139 MMOL/L — SIGNIFICANT CHANGE UP (ref 135–146)
SP GR SPEC: >=1.03 — SIGNIFICANT CHANGE UP (ref 1.01–1.03)
UROBILINOGEN FLD QL: 1 (ref 0.2–0.2)
WBC # BLD: 7.71 K/UL — SIGNIFICANT CHANGE UP (ref 4.8–10.8)
WBC # FLD AUTO: 7.71 K/UL — SIGNIFICANT CHANGE UP (ref 4.8–10.8)

## 2019-01-10 PROCEDURE — 99223 1ST HOSP IP/OBS HIGH 75: CPT

## 2019-01-10 RX ORDER — CHLORHEXIDINE GLUCONATE 213 G/1000ML
1 SOLUTION TOPICAL
Qty: 0 | Refills: 0 | Status: DISCONTINUED | OUTPATIENT
Start: 2019-01-10 | End: 2019-01-11

## 2019-01-10 RX ORDER — ASPIRIN/CALCIUM CARB/MAGNESIUM 324 MG
81 TABLET ORAL DAILY
Qty: 0 | Refills: 0 | Status: DISCONTINUED | OUTPATIENT
Start: 2019-01-10 | End: 2019-01-11

## 2019-01-10 RX ORDER — CYCLOBENZAPRINE HYDROCHLORIDE 10 MG/1
10 TABLET, FILM COATED ORAL THREE TIMES A DAY
Qty: 0 | Refills: 0 | Status: DISCONTINUED | OUTPATIENT
Start: 2019-01-10 | End: 2019-01-11

## 2019-01-10 RX ORDER — ATORVASTATIN CALCIUM 80 MG/1
20 TABLET, FILM COATED ORAL AT BEDTIME
Qty: 0 | Refills: 0 | Status: DISCONTINUED | OUTPATIENT
Start: 2019-01-10 | End: 2019-01-11

## 2019-01-10 RX ORDER — HEPARIN SODIUM 5000 [USP'U]/ML
5000 INJECTION INTRAVENOUS; SUBCUTANEOUS EVERY 8 HOURS
Qty: 0 | Refills: 0 | Status: DISCONTINUED | OUTPATIENT
Start: 2019-01-10 | End: 2019-01-11

## 2019-01-10 RX ORDER — OXYCODONE AND ACETAMINOPHEN 5; 325 MG/1; MG/1
2 TABLET ORAL EVERY 6 HOURS
Qty: 0 | Refills: 0 | Status: DISCONTINUED | OUTPATIENT
Start: 2019-01-10 | End: 2019-01-11

## 2019-01-10 RX ADMIN — OXYCODONE AND ACETAMINOPHEN 2 TABLET(S): 5; 325 TABLET ORAL at 18:07

## 2019-01-10 NOTE — ED PROVIDER NOTE - OBJECTIVE STATEMENT
64 yo m with cad with stents, copd, etoh abuse, mets to spine, unknown primary ca, called back by ED to be admitted.  as per previous chart and pt, pt has been having right sided abd pain.  pt was sent by dr. kwan to be admitted for primary ca w/u.  pt was seen last month, found to have a pathologic t10 compression fx that was treated by dr. kwan.  surg pathology sent showing positive cancer markers. pt was supposed to f/u with oncology and pmd, but dr. kwan is concerned about pt's poor follow up.  pt says has an appt with onco on jan 22nd but cannot recall the name of doctor.  pt denies n/v/d, cp, sob.

## 2019-01-10 NOTE — ED PROVIDER NOTE - MEDICAL DECISION MAKING DETAILS
pt with metastatic CA, unknown primary, mets to spine, sent in by dr kwan for onco w/u due to concern of pt's noncompliance.  pt admits he has been haivng abd pain, CT 2 days ago was neg for acute pathology.  labs unremarkable, cxr shows bibasilar opacities, CT 2 days ago showed as ground glass opacities.  pt has no cough or fever.  will hold off on abx.  will admit to hospitalist

## 2019-01-10 NOTE — H&P ADULT - NSHPLABSRESULTS_GEN_ALL_CORE
12.6   7.71  )-----------( 233      ( 10 Antwan 2019 12:36 )             38.3       01-10    139  |  100  |  19  ----------------------------<  98  4.6   |  25  |  0.8    Ca    9.4      10 Antwan 2019 12:36    TPro  7.6  /  Alb  4.0  /  TBili  0.3  /  DBili  x   /  AST  22  /  ALT  29  /  AlkPhos  94  01-10              Urinalysis Basic - ( 10 Antwan 2019 12:55 )    Color: Dark Yellow / Appearance: Clear / SG: >=1.030 / pH: x  Gluc: x / Ketone: Trace  / Bili: Small / Urobili: 1.0   Blood: x / Protein: Negative / Nitrite: Negative   Leuk Esterase: Negative / RBC: x / WBC x   Sq Epi: x / Non Sq Epi: x / Bacteria: x        PT/INR - ( 09 Jan 2019 06:54 )   PT: 11.10 sec;   INR: 0.96 ratio         PTT - ( 09 Jan 2019 06:54 )  PTT:32.5 sec        < from: Xray Chest 2 Views PA/Lat (01.10.19 @ 13:39) >    Impression:      Bibasilar opacities    < end of copied text >    < from: CT Abdomen and Pelvis w/ IV Cont (01.09.19 @ 08:59) >    IMPRESSION:     Since 11/24/2018:    No CT evidence of acute intra-abdominal pathology.    Redemonstrated multifocal osseous lytic lesions consistent with  metastatic disease.    < end of copied text >    < from: MR Thoracic Spine w/wo IV Cont (12.04.18 @ 17:14) >    IMPRESSION:    1.  Multiple foci of bone marrow signal abnormality consistent with   osseous metastatic disease.    2.  Complete infiltration of the T10 vertebral body with mild (30%)   pathologic fracture and ventral epidural soft tissue component measuring   up to 5 mm in width producing severe spinal stenosis with mild spinal   cord flattening. No evidence of associated spinal cord edema.    3.  Additional lesions noted within T8, T9 and T11.    < end of copied text >

## 2019-01-10 NOTE — ED PROVIDER NOTE - PHYSICAL EXAMINATION
VITAL SIGNS: I have reviewed nursing notes and confirm.  CONSTITUTIONAL: Well-developed; well-nourished; in no acute distress.  SKIN: Skin exam is warm and dry, no acute rash.  HEAD: Normocephalic; atraumatic.  EYES: PERRL, EOM intact; conjunctiva and sclera clear.  ENT: No nasal discharge; airway clear. TMs clear.  NECK: Supple; non tender.  CARD: S1, S2 normal; no murmurs, gallops, or rubs. Regular rate and rhythm.  RESP: No wheezes, rales or rhonchi.  ABD: Normal bowel sounds; soft; non-distended; non-tender;  EXT: Normal ROM. No clubbing, cyanosis or edema.  NEURO: aox3, cn2-12 grossly normal, 5/5 strength all ext, sensation intact,

## 2019-01-10 NOTE — H&P ADULT - HISTORY OF PRESENT ILLNESS
This is a 64 YO M with an PMH of osteolytic lesions to thoracic spine (T10), T10 compression fx s/p kyphoplasty, unknown primary ca, COPD, HTN, HLD, CAD with MI and 2 stents, ETOH abuse, who presented to the ED yesterday, was sent by NSx Dr Fletcher due to recurring RLQ abdominal pain radiating to groin, exacerbated by movement, bending forward, and palpation, but denies nausea/vomiting, diarrhea, numbness, weakness, gait changes, fevers/chills, constipation, urinary symptoms, CP, SOB, testicular pain, penile discharge, and rashes. The patient left the ED yesterday but was recalled as Dr Fletcher was concerned about the patient's poor follow up and wanted the workup by NSx and Heme/Onc to be completed in the hospital. PT has had MRI's and PET scans within the past 3 months, as well as biopsy.  Denies urinary symptoms, saddle anesthesia, changes in gait, n/v, changes in BM.     ICU Vital Signs Last 24 Hrs  T(C): 36.4 (10 Antwan 2019 15:34), Max: 36.4 (10 Antwan 2019 11:16)  T(F): 97.5 (10 Antwan 2019 15:34), Max: 97.5 (10 Antwan 2019 11:16)  HR: 74 (10 Antwan 2019 15:34) (74 - 82)  BP: 137/73 (10 Antwan 2019 15:34) (137/73 - 148/71)  BP(mean): --  ABP: --  ABP(mean): --  RR: 18 (10 Antwan 2019 15:34) (18 - 18)  SpO2: 98% (10 Antwan 2019 15:34) (95% - 98%)

## 2019-01-10 NOTE — H&P ADULT - NSHPSOCIALHISTORY_GEN_ALL_CORE
Former smoker 60 pack year Hx, quit 5 years ago  Drinks 8-10 beers frequently, mostly on weekends, last beer was Monday morning. Has never had withdrawals  Occasional cannabis smoker.

## 2019-01-10 NOTE — H&P ADULT - ASSESSMENT
63 M with metastatic osseous lesions in spine and unknown primary as well as CAD, HTN, COPD, HLD presents after being sent by NSx for treatment of RLQ abdominal pain and cancer workup. Currently comfortable, labs and imaging unremarkable for acute disease, CT scan redemonstrated know osseous lesions.     1) RLQ abdominal pain: likely 2/2 Unknown primary Ca with mets to spine  - FU Neurosurgery, Heme/Onc  - c/w Percocet, Cyclobenzaprine    2) CAD s/p MI and stents; DLD  - c/w ASA, Statin    PPx/Diet/Dispo  - SQ heparin  - DASH diet  - Full code, from home

## 2019-01-10 NOTE — ED ADULT NURSE NOTE - PMH
Alcohol abuse    Back pain    CAD (coronary artery disease)    COPD (chronic obstructive pulmonary disease)    High cholesterol    MI (myocardial infarction)    Stented coronary artery  RCA X2

## 2019-01-10 NOTE — CONSULT NOTE ADULT - SUBJECTIVE AND OBJECTIVE BOX
This is a 62 YO M with an PMH of osteolytic lesions to thoracic spine (T10), T10 compression fx s/p kyphoplasty, unknown primary ca, COPD, HTN, HLD, CAD with MI and 2 stents, ETOH abuse, who presented to the ED yesterday, was sent by NSx Dr Fletcher due to recurring RLQ abdominal pain radiating to groin, exacerbated by movement, bending forward, and palpation, but denies nausea/vomiting, diarrhea, numbness, weakness, gait changes, fevers/chills, constipation, urinary symptoms, CP, SOB, testicular pain, penile discharge, and rashes. The patient left the ED yesterday but was recalled as Dr Fletcher was concerned about the patient's poor follow up and wanted the workup by NSx and Heme/Onc to be completed in the hospital. PT has had MRI's and PET scans within the past 3 months, as well as biopsy.  Denies urinary symptoms, saddle anesthesia, changes in gait, n/v, changes in BM.     MEDICATIONS  (STANDING):  aspirin enteric coated 81 milliGRAM(s) Oral daily  atorvastatin 20 milliGRAM(s) Oral at bedtime  chlorhexidine 4% Liquid 1 Application(s) Topical <User Schedule>  heparin  Injectable 5000 Unit(s) SubCutaneous every 8 hours    MEDICATIONS  (PRN):  cyclobenzaprine 10 milliGRAM(s) Oral three times a day PRN Muscle Spasm  oxyCODONE    5 mG/acetaminophen 325 mG 2 Tablet(s) Oral every 6 hours PRN Severe Pain (7 - 10)      Allergies    No Known Allergies        Vital Signs Last 24 Hrs  T(C): 36.6 (10 Antwan 2019 21:39), Max: 36.6 (10 Antwan 2019 21:39)  T(F): 97.8 (10 Antwan 2019 21:39), Max: 97.8 (10 Antwan 2019 21:39)  HR: 84 (10 Antwan 2019 21:39) (74 - 84)  BP: 147/82 (10 Antwan 2019 21:39) (137/73 - 148/71)  BP(mean): --  RR: 18 (10 Antwan 2019 21:39) (18 - 18)  SpO2: 98% (10 Antwan 2019 15:34) (95% - 98%)    PHYSICAL EXAM:    GENERAL: NAD, well-groomed, well-developed  HEAD:  Atraumatic, Normocephalic  EYES: EOMI, PERRLA, conjunctiva and sclera clear  NERVOUS SYSTEM:  Awake  alert oriented to self, place situation   Fund of knowledge, recent and remote memory are intact   Mood; normal affect   CN II-XII intact PERRRL, EOMI, no ptosis, no Nystagmus, normal shoulder shrug   Face symmetrical tongue is midline speech is clear and fluent  Motor: 5/5 UE/LE all muscle groups   Sensory: No deficit to light touch   Gait is not tested      EXTREMITIES:  2+ Peripheral Pulses, No clubbing, cyanosis, or edema      LABS:                        12.6   7.71  )-----------( 233      ( 10 Antwan 2019 12:36 )             38.3     01-10    139  |  100  |  19  ----------------------------<  98  4.6   |  25  |  0.8    Ca    9.4      10 Antwan 2019 12:36    TPro  7.6  /  Alb  4.0  /  TBili  0.3  /  DBili  x   /  AST  22  /  ALT  29  /  AlkPhos  94  01-10    PT/INR - ( 09 Jan 2019 06:54 )   PT: 11.10 sec;   INR: 0.96 ratio         PTT - ( 09 Jan 2019 06:54 )  PTT:32.5 sec  Urinalysis Basic - ( 10 Antwan 2019 12:55 )    Color: Dark Yellow / Appearance: Clear / SG: >=1.030 / pH: x  Gluc: x / Ketone: Trace  / Bili: Small / Urobili: 1.0   Blood: x / Protein: Negative / Nitrite: Negative   Leuk Esterase: Negative / RBC: x / WBC x   Sq Epi: x / Non Sq Epi: x / Bacteria: x        RADIOLOGY & ADDITIONAL TESTS:  < from: MR Thoracic Spine w/wo IV Cont (12.04.18 @ 17:14) >    IMPRESSION:    1.  Multiple foci of bone marrow signal abnormality consistent with   osseous metastatic disease.    2.  Complete infiltration of the T10 vertebral body with mild (30%)   pathologic fracture and ventral epidural soft tissue component measuring   up to 5 mm in width producing severe spinal stenosis with mild spinal   cord flattening. No evidence of associated spinal cord edema.    3.  Additional lesions noted within T8, T9 and T11.    < end of copied text >

## 2019-01-10 NOTE — H&P ADULT - NSHPPHYSICALEXAM_GEN_ALL_CORE
PHYSICAL EXAM:  GENERAL: Middle aged M, sitting in chair, appears comfortable. In NAD, well-developed  HEAD:  Atraumatic, Normocephalic  EYES: EOMI, PERRLA, conjunctiva and sclera clear  NECK: Supple, No JVD  CHEST/LUNG: Clear to auscultation bilaterally; No wheeze  HEART: Regular rate and rhythm; No murmurs, rubs, or gallops  ABDOMEN: Soft, mildly tender RLQ, Nondistended; Bowel sounds present  EXTREMITIES:  2+ Peripheral Pulses, No clubbing, cyanosis, or edema  PSYCH: AAOx3  NEUROLOGY: non-focal  SKIN: No rashes or lesions

## 2019-01-10 NOTE — ED PROVIDER NOTE - NS ED ROS FT
Review of Systems:  	•	CONSTITUTIONAL - no fever, no diaphoresis, no weight change  	•	SKIN - no rash  	•	HEMATOLOGIC - no bleeding, no bruising  	•	EYES - no eye pain, no blurred vision  	•	ENT - no change in hearing, no pain  	•	RESPIRATORY - no shortness of breath, no cough  	•	CARDIAC - no chest pain, no palpitations  	•	GI - right abd pain, no nausea, no vomiting, no diarrhea, no constipation, no bleeding  	•	 - no dysuria, no hematuria, no flank pain, no urinary retention  	•	MUSCULOSKELETAL - no joint paint, no swelling, no redness, chronic back pain  	•	NEUROLOGIC - no weakness, no headache, no paresthesias, no dizziness  All other systems negative, unless specified in HPI

## 2019-01-10 NOTE — H&P ADULT - NSHPREVIEWOFSYSTEMS_GEN_ALL_CORE
REVIEW OF SYSTEMS:    CONSTITUTIONAL: No weakness, fevers or chills  EYES/ENT: No visual changes;  No vertigo or throat pain   NECK: No pain or stiffness  RESPIRATORY: No cough, wheezing, hemoptysis; No shortness of breath  CARDIOVASCULAR: No chest pain or palpitations  GASTROINTESTINAL: + abdominal pain. No nausea, vomiting, or hematemesis; No diarrhea or constipation. No melena or hematochezia.  GENITOURINARY: No dysuria, frequency or hematuria  NEUROLOGICAL: No numbness or weakness  MUSCULOSKELETAL: No muscle or joint pain, stiffness, or swelling  SKIN: No itching, rashes

## 2019-01-10 NOTE — H&P ADULT - ATTENDING COMMENTS
Patient is seen and examined independently at bedside. I agree with the resident's note, history and plan as above. Pt is initially admitted in Nov 2018 for back pain found to have metastatic lytic lesions in the spine, pt had PET and bone scan during the admission, pt opted for further outpatient work up, followed up with neurosurgery for T10 kyphoplasty and biopsy positive for metastatic carcinoma and CEA-p positive. Last colonoscopy around 2016 as per patient without significant findings (not in medical records). Previous CT Chest, Abdomen and Pelvis does not find any other mass other than the bone findings, UPEP, SPEP were normal previous admission and immunofixation does not know any monoclonal bands.     EKG reviewed, normal sinus rhythm, no ST-Twave abnormality    T(F): 97.8 (01-10-19 @ 21:39), Max: 97.8 (01-10-19 @ 21:39)  HR: 84 (01-10-19 @ 21:39) (74 - 84)  BP: 147/82 (01-10-19 @ 21:39) (137/73 - 148/71)  RR: 18 (01-10-19 @ 21:39) (18 - 18)  SpO2: 98% (01-10-19 @ 15:34) (95% - 98%)    PHYSICAL EXAM:  CONSTITUTIONAL: NAD, well-groomed, well-developed, obese.  HEAD:  Atraumatic, Normocephalic.  EYES: EOMI, PERRLA, conjunctiva and sclera clear.  ENMT: Moist mucous membranes, No lesions.  NERVOUS SYSTEM:  Alert & Oriented X3, Good concentration; No limb weakness or numbness.  RESPIRATORY: Clear to ascultation bilaterally; No rales, rhonchi, wheezing, or rubs.  CARDIOVASCULAR: Regular rate and rhythm; No murmurs, rubs, or gallops.  GASTROINTESTINAL: Soft, Nondistended; Bowel sounds present. Right sided abdominal and inguinal tenderness.   MUSCULOSKELETAL: No joint swelling or tenderness. No neck or back pain.  EXTREMITIES: No clubbing, cyanosis, or edema.  LYMPH: No palpable cervical, axillary, inguinal or abdominal lymphadenopathy noted.  SKIN: No rashes or lesions.    # Metastatic lytic lesions in the spine  # Right lower quadrant abdominal pain likely 2/2 lytic lesions in right anterior lilium and spine (noted on PET scan)  - present on prior admission, CT abdomen/pelvis negative, US testicles prior admission noted negative   - follow up Heme/Onc for further work-up  - pain control, on percocet and cyclobenzaprine, will start on bowel regimen    # CAD s/p MI and stents and DLD  - c/w aspirin and statin    # DVT Prophylaxis  - c/w lovenox subcut, will give BID due to pt's weight    All labs, radiologic studies, vitals, orders and medications list reviewed.

## 2019-01-10 NOTE — ED ADULT TRIAGE NOTE - CHIEF COMPLAINT QUOTE
patient c/o right side abdominal pain and back pain. Patient sent in by his neurosurgeon to be admitted for oncology work up.

## 2019-01-11 ENCOUNTER — TRANSCRIPTION ENCOUNTER (OUTPATIENT)
Age: 64
End: 2019-01-11

## 2019-01-11 VITALS — HEIGHT: 66 IN | WEIGHT: 245.82 LBS

## 2019-01-11 DIAGNOSIS — R10.31 RIGHT LOWER QUADRANT PAIN: ICD-10-CM

## 2019-01-11 DIAGNOSIS — Z98.890 OTHER SPECIFIED POSTPROCEDURAL STATES: Chronic | ICD-10-CM

## 2019-01-11 DIAGNOSIS — M54.5 LOW BACK PAIN: ICD-10-CM

## 2019-01-11 LAB
ANION GAP SERPL CALC-SCNC: 13 MMOL/L — SIGNIFICANT CHANGE UP (ref 7–14)
BASOPHILS # BLD AUTO: 0.02 K/UL — SIGNIFICANT CHANGE UP (ref 0–0.2)
BASOPHILS NFR BLD AUTO: 0.3 % — SIGNIFICANT CHANGE UP (ref 0–1)
BUN SERPL-MCNC: 15 MG/DL — SIGNIFICANT CHANGE UP (ref 10–20)
CALCIUM SERPL-MCNC: 9.6 MG/DL — SIGNIFICANT CHANGE UP (ref 8.5–10.1)
CHLORIDE SERPL-SCNC: 101 MMOL/L — SIGNIFICANT CHANGE UP (ref 98–110)
CO2 SERPL-SCNC: 27 MMOL/L — SIGNIFICANT CHANGE UP (ref 17–32)
CREAT SERPL-MCNC: 0.9 MG/DL — SIGNIFICANT CHANGE UP (ref 0.7–1.5)
EOSINOPHIL # BLD AUTO: 0.23 K/UL — SIGNIFICANT CHANGE UP (ref 0–0.7)
EOSINOPHIL NFR BLD AUTO: 3.8 % — SIGNIFICANT CHANGE UP (ref 0–8)
GLUCOSE SERPL-MCNC: 84 MG/DL — SIGNIFICANT CHANGE UP (ref 70–99)
HCT VFR BLD CALC: 37.7 % — LOW (ref 42–52)
HGB BLD-MCNC: 12.8 G/DL — LOW (ref 14–18)
IMM GRANULOCYTES NFR BLD AUTO: 0.2 % — SIGNIFICANT CHANGE UP (ref 0.1–0.3)
LDH SERPL L TO P-CCNC: 180 U/L — SIGNIFICANT CHANGE UP (ref 50–242)
LYMPHOCYTES # BLD AUTO: 2.45 K/UL — SIGNIFICANT CHANGE UP (ref 1.2–3.4)
LYMPHOCYTES # BLD AUTO: 40 % — SIGNIFICANT CHANGE UP (ref 20.5–51.1)
MAGNESIUM SERPL-MCNC: 1.9 MG/DL — SIGNIFICANT CHANGE UP (ref 1.8–2.4)
MCHC RBC-ENTMCNC: 28.3 PG — SIGNIFICANT CHANGE UP (ref 27–31)
MCHC RBC-ENTMCNC: 34 G/DL — SIGNIFICANT CHANGE UP (ref 32–37)
MCV RBC AUTO: 83.2 FL — SIGNIFICANT CHANGE UP (ref 80–94)
MONOCYTES # BLD AUTO: 0.53 K/UL — SIGNIFICANT CHANGE UP (ref 0.1–0.6)
MONOCYTES NFR BLD AUTO: 8.6 % — SIGNIFICANT CHANGE UP (ref 1.7–9.3)
NEUTROPHILS # BLD AUTO: 2.89 K/UL — SIGNIFICANT CHANGE UP (ref 1.4–6.5)
NEUTROPHILS NFR BLD AUTO: 47.1 % — SIGNIFICANT CHANGE UP (ref 42.2–75.2)
PLATELET # BLD AUTO: 233 K/UL — SIGNIFICANT CHANGE UP (ref 130–400)
POTASSIUM SERPL-MCNC: 4.1 MMOL/L — SIGNIFICANT CHANGE UP (ref 3.5–5)
POTASSIUM SERPL-SCNC: 4.1 MMOL/L — SIGNIFICANT CHANGE UP (ref 3.5–5)
RBC # BLD: 4.53 M/UL — LOW (ref 4.7–6.1)
RBC # FLD: 13.1 % — SIGNIFICANT CHANGE UP (ref 11.5–14.5)
SODIUM SERPL-SCNC: 141 MMOL/L — SIGNIFICANT CHANGE UP (ref 135–146)
WBC # BLD: 6.13 K/UL — SIGNIFICANT CHANGE UP (ref 4.8–10.8)
WBC # FLD AUTO: 6.13 K/UL — SIGNIFICANT CHANGE UP (ref 4.8–10.8)

## 2019-01-11 RX ORDER — ENOXAPARIN SODIUM 100 MG/ML
40 INJECTION SUBCUTANEOUS
Qty: 0 | Refills: 0 | Status: DISCONTINUED | OUTPATIENT
Start: 2019-01-11 | End: 2019-01-11

## 2019-01-11 RX ORDER — ENOXAPARIN SODIUM 100 MG/ML
40 INJECTION SUBCUTANEOUS DAILY
Qty: 0 | Refills: 0 | Status: DISCONTINUED | OUTPATIENT
Start: 2019-01-11 | End: 2019-01-11

## 2019-01-11 RX ORDER — DOCUSATE SODIUM 100 MG
100 CAPSULE ORAL THREE TIMES A DAY
Qty: 0 | Refills: 0 | Status: DISCONTINUED | OUTPATIENT
Start: 2019-01-11 | End: 2019-01-11

## 2019-01-11 RX ORDER — SENNA PLUS 8.6 MG/1
1 TABLET ORAL AT BEDTIME
Qty: 0 | Refills: 0 | Status: DISCONTINUED | OUTPATIENT
Start: 2019-01-11 | End: 2019-01-11

## 2019-01-11 RX ORDER — OXYCODONE AND ACETAMINOPHEN 5; 325 MG/1; MG/1
2 TABLET ORAL ONCE
Qty: 0 | Refills: 0 | Status: DISCONTINUED | OUTPATIENT
Start: 2019-01-11 | End: 2019-01-11

## 2019-01-11 RX ADMIN — ATORVASTATIN CALCIUM 20 MILLIGRAM(S): 80 TABLET, FILM COATED ORAL at 12:31

## 2019-01-11 RX ADMIN — OXYCODONE AND ACETAMINOPHEN 2 TABLET(S): 5; 325 TABLET ORAL at 13:30

## 2019-01-11 RX ADMIN — OXYCODONE AND ACETAMINOPHEN 2 TABLET(S): 5; 325 TABLET ORAL at 00:15

## 2019-01-11 RX ADMIN — Medication 100 MILLIGRAM(S): at 05:13

## 2019-01-11 RX ADMIN — OXYCODONE AND ACETAMINOPHEN 2 TABLET(S): 5; 325 TABLET ORAL at 00:45

## 2019-01-11 RX ADMIN — Medication 81 MILLIGRAM(S): at 12:34

## 2019-01-11 RX ADMIN — OXYCODONE AND ACETAMINOPHEN 2 TABLET(S): 5; 325 TABLET ORAL at 05:14

## 2019-01-11 RX ADMIN — OXYCODONE AND ACETAMINOPHEN 2 TABLET(S): 5; 325 TABLET ORAL at 17:12

## 2019-01-11 RX ADMIN — OXYCODONE AND ACETAMINOPHEN 2 TABLET(S): 5; 325 TABLET ORAL at 12:30

## 2019-01-11 RX ADMIN — OXYCODONE AND ACETAMINOPHEN 2 TABLET(S): 5; 325 TABLET ORAL at 05:45

## 2019-01-11 RX ADMIN — Medication 100 MILLIGRAM(S): at 13:59

## 2019-01-11 NOTE — PROGRESS NOTE ADULT - ASSESSMENT
63 M with metastatic osseous lesions in spine and unknown primary as well as CAD, HTN, COPD, HLD presents after being sent by NSx for treatment of RLQ abdominal pain and cancer workup.    1) RLQ abdominal pain: likely 2/2 Unknown primary Ca with mets to spine  - s/p T10 kyphoplasty  - Neurosurg following - attending note to follow  - Heme/Onc consult - pending  - pain control PRN w/ Percocet  - PSA wnl, no other tumor markers noted in EMR  - CT chest/abd/pel revealed only vertebral lesions    2) CAD s/p MI and stents; DLD  - c/w ASA, Statin    PPx/Diet/Dispo  - SQ heparin  - DASH diet  - Full code, from home 63 M with metastatic osseous lesions in spine and unknown primary as well as CAD, HTN, COPD, HLD presents after being sent by NSx for treatment of RLQ abdominal pain and cancer workup.    #) Abdominal pain likely 2/2 Unknown primary Ca with mets to spine  - s/p T10 kyphoplasty  - Neurosurg following - attending note to follow  - Heme/Onc consult - pending  - pain control PRN w/ Percocet  - PSA wnl, no other tumor markers noted in EMR  - CT chest/abd/pel revealed only vertebral lesions    #) CAD s/p MI and stents; DLD  - c/w ASA, Statin    PPx/Diet/Dispo  - SQ heparin  - DASH diet  - Full code, from home

## 2019-01-11 NOTE — DISCHARGE NOTE ADULT - MEDICATION SUMMARY - MEDICATIONS TO TAKE
I will START or STAY ON the medications listed below when I get home from the hospital:    aspirin 81 mg oral delayed release tablet  -- 1 tab(s) by mouth once a day  -- Indication: For CAD    Percocet 5/325 oral tablet  -- 1 tab(s) by mouth every 6 hours, As Needed pain  -- Indication: For Pain    Lipitor 20 mg oral tablet  -- 1 tab(s) by mouth once a day  -- Indication: For CAD    cyclobenzaprine 10 mg oral tablet  -- 1 tab(s) by mouth every 8 hours   -- May cause drowsiness.  Alcohol may intensify this effect.  Use care when operating dangerous machinery.  Obtain medical advice before taking any non-prescription drugs as some may affect the action of this medication.    -- Indication: For Muscle spasms

## 2019-01-11 NOTE — CONSULT NOTE ADULT - ATTENDING COMMENTS
This is a 63-year-old male with extensive medical history including COPD quit smoking 5 years ago, hypertension, coronary artery disease with MI status post times as well as alcohol abuse who was recently diagnosed with cancer of unknown primary with metastatic disease of the bones status post recent T10 kyphoplasty. She was sent here by neurosurgical colleagues due to delay in establishing care, as well as new right lower quadrant pain it appears to be related to his metastatic disease.    He had multiple images, including testicular ultrasound, CT chest abdomen and pelvis with contrast as well as a bone scan.    The pathology from the kyphoplasty was discussed with Dr. Preston Hill, he suspects that it is carcinoma, most likely adenocarcinoma and given the staining for CEA maybe even pancreatic in origin. Unfortunately because it is a specimen from a bone the decalcification process will allow us to send the tumor for gene expression profile/next generation  sequencing or for neutrophil receptor tyrosine kinase gene fusion.    He may be able to obtain mismatch repair status.    We will also send the specimen for second opinion.    Plan:  #Cancer of unknown primary most likely adenocarcinoma in origin  -Given his smoking history would suspect lung or bladder to be primary, there is some CEA staining but this is nonspecific but pancreatic may be possible as well  -Will obtain outpatient PET/CT scan although this may not be of any help  -Will followup MMR status as well as second opinion  -Will check serum CEA as well as CA 19-9, AFP, LDH,  beta hCG  -Depending on those results will decide on the chemotherapy regimen but currently favoring either carboplatin and Taxol or modified FOLFOX  -Rebiopsy would not help since it would be a bony specimen and decalcification once again prevent us from sending specialist sequencing as discussed with pathology team  -For his pain he could be sent home on Percocet and will possibly arrange for radiation oncology evaluation if pain is not controlled  -I provided with a copy of those records they're considering a second opinion in [facility] but will also arrange followup with us next week.  -He also has mild anemia this may be related to malignancy   -I recommended that he start cutting down on drinking  -I also went over the prognosis especially if a second opinion conference this is an adenocarcinoma the median survival times have been reported to be in approximately 10-12 months    Thank you

## 2019-01-11 NOTE — CONSULT NOTE ADULT - PROBLEM SELECTOR RECOMMENDATION 9
CT scans of chest abdominal and pelvis are negative, colonoscopy negative.  Pain control with percocept and cyclobenzaprine.

## 2019-01-11 NOTE — DISCHARGE NOTE ADULT - CARE PLAN
Principal Discharge DX:	Cancer of unknown origin  Goal:	control  Assessment and plan of treatment:	Follow up with Oncologist and Neurosurgeon. PET CT needs to be done. Blood work was drawn during this admission. Follow up with Oncologist for results.

## 2019-01-11 NOTE — DISCHARGE NOTE ADULT - HOSPITAL COURSE
63 M with metastatic osseous lesions in spine and unknown primary as well as CAD, HTN, COPD, HLD presents after being sent by NSx for treatment of RLQ abdominal pain and cancer workup.    #) Abdominal pain likely 2/2 Unknown primary Ca with mets to spine  - s/p T10 kyphoplasty  - Neurosurg following - outpatient follow up  - Heme/Onc following - outpatient follow up  - pain control PRN w/ Percocet  - PSA wnl, no other tumor markers noted in EMR  - CT chest/abd/pel revealed only vertebral lesions    #) CAD s/p MI and stents; DLD  - c/w ASA, Statin

## 2019-01-11 NOTE — PROGRESS NOTE ADULT - SUBJECTIVE AND OBJECTIVE BOX
SUBJECTIVE:    Patient doing well. Anxious to find out plan of care. States pain is decently controlled with Percocet. Does NOT want anything stronger.    PAST MEDICAL & SURGICAL HISTORY  MI (myocardial infarction)  Back pain  Alcohol abuse  Stented coronary artery: RCA X2  COPD (chronic obstructive pulmonary disease)  CAD (coronary artery disease)  High cholesterol  S/P kyphoplasty    SOCIAL HISTORY:  Negative for smoking/alcohol/drug use.     ALLERGIES:  No Known Allergies    MEDICATIONS:  STANDING MEDICATIONS  aspirin enteric coated 81 milliGRAM(s) Oral daily  atorvastatin 20 milliGRAM(s) Oral at bedtime  chlorhexidine 4% Liquid 1 Application(s) Topical <User Schedule>  docusate sodium 100 milliGRAM(s) Oral three times a day  enoxaparin Injectable 40 milliGRAM(s) SubCutaneous daily  senna 1 Tablet(s) Oral at bedtime    PRN MEDICATIONS  cyclobenzaprine 10 milliGRAM(s) Oral three times a day PRN  oxyCODONE    5 mG/acetaminophen 325 mG 2 Tablet(s) Oral every 6 hours PRN    VITALS:   T(F): 96  HR: 78  BP: 161/80  RR: 18  SpO2: 93%    LABS:                        12.8   6.13  )-----------( 233      ( 11 Jan 2019 06:23 )             37.7     01-11    141  |  101  |  15  ----------------------------<  84  4.1   |  27  |  0.9    Ca    9.6      11 Jan 2019 06:23  Mg     1.9     01-11    TPro  7.6  /  Alb  4.0  /  TBili  0.3  /  DBili  x   /  AST  22  /  ALT  29  /  AlkPhos  94  01-10    Urinalysis Basic - ( 10 Antwan 2019 12:55 )    Color: Dark Yellow / Appearance: Clear / SG: >=1.030 / pH: x  Gluc: x / Ketone: Trace  / Bili: Small / Urobili: 1.0   Blood: x / Protein: Negative / Nitrite: Negative   Leuk Esterase: Negative / RBC: x / WBC x   Sq Epi: x / Non Sq Epi: x / Bacteria: x    PHYSICAL EXAM:  GEN: NAD, comfortable  LUNGS: CTAB, no w/r/r  HEART: RRR, no m/r/g  ABD: soft, NT/ND, +BS  EXT: no edema, PP b/l  NEURO: AAOx3

## 2019-01-11 NOTE — DISCHARGE NOTE ADULT - CARE PROVIDERS DIRECT ADDRESSES
,DirectAddress_Unknown,[email or web address]

## 2019-01-11 NOTE — CONSULT NOTE ADULT - SUBJECTIVE AND OBJECTIVE BOX
Pt. 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 Pt. was sent by his outpatient Neurosurgeon Dr. Fletcher. Pt. decribe the RLQ abd pain as 8/10 radiating down to groin, exacerbated by movement and bending forward, associated symptoms include burning sensation with urination. Pt. denies any nausea, vomiting, saddle anesthesia, LE weakness, gait abnormalities, fevers, bowel movement changes or testicular pain. Pt. was initially admitted to [facility] in Early December 2018 due to back pain and found to have metastatic lytic lesions to the spine after PET and bone scan. Pt. followed up with neurosurgery for outpatient work up and had T10 kyphoplasty with biopsy positive for metastatic carcinoma. CT chest, Abdomen and Pelvis are negative except the metastatic bone findings. Previous ultrasound of the testicles negative. Previous colonoscopy was in 2016 and was negative per patient. UPEP, SPEP and immunofixation from previous admission were negative.     ICU Vital Signs Last 24 Hrs  T(C): 36.4 (10 Antwan 2019 15:34), Max: 36.4 (10 Antwan 2019 11:16)  T(F): 97.5 (10 Antwan 2019 15:34), Max: 97.5 (10 Antwan 2019 11:16)  HR: 74 (10 Antwan 2019 15:34) (74 - 82)  BP: 137/73 (10 Antwan 2019 15:34) (137/73 - 148/71)  BP(mean): --  ABP: --  ABP(mean): --  RR: 18 (10 Antwan 2019 15:34) (18 - 18)  SpO2: 98% (10 Antwan 2019 15:34) (95% - 98%)       Review of Systems:  Review of Systems: REVIEW OF SYSTEMS:   CONSTITUTIONAL: No weakness No fevers or chills  EYES/ENT: No visual changes;  No vertigo or throat pain   NECK: No pain or stiffness  RESPIRATORY: No shortness of breath  CARDIOVASCULAR: No chest pain or palpitations  GASTROINTESTINAL: + abdominal pain. No nausea, vomiting, or hematemesis; No diarrhea or constipation. No melena or hematochezia.  GENITOURINARY: No dysuria, frequency or hematuria  NEUROLOGICAL: No numbness or weakness  MUSCULOSKELETAL: No muscle or joint pain, stiffness, or swelling	  Other Review of Systems: All other review of systems negative, except as noted in HPI 	      Allergies and Intolerances:        Allergies:  	No Known Allergies:     Home Medications:   * Patient Currently Takes Medications as of 09-Jan-2019 11:27 documented in Structured Notes  · 	cyclobenzaprine 10 mg oral tablet: 1 tab(s) orally every 8 hours   · 	Lipitor 20 mg oral tablet: 1 tab(s) orally once a day  · 	aspirin 81 mg oral delayed release tablet: 1 tab(s) orally once a day  · 	Percocet 5/325 oral tablet: 1 tab(s) orally every 6 hours, As Needed pain      Patient History:    Past Medical History:  Alcohol abuse    Back pain    CAD (coronary artery disease)    COPD (chronic obstructive pulmonary disease)    High cholesterol    MI (myocardial infarction)    Stented coronary artery  RCA X2.     Past Surgical History:  S/P kyphoplasty.     Family History:  Mother  Still living? Unknown  Family history of hyperlipidemia, Age at diagnosis: Age Unknown     Father  Still living? Unknown  Family history of coronary artery disease, Age at diagnosis: Age Unknown.     Social History:  Social History (marital status, living situation, occupation, tobacco use, alcohol and drug use, and sexual history):  Tobacco: Former smoker, 60 pack years, quit 5 years ago EtOH: 10 drinks per week. Recreational drugs: Occasional cannabis smoker.	       Physical Exam:  Physical Exam: PHYSICAL EXAM:  GENERAL: In NAD, well-developed  HEAD:  Atraumatic, Normocephalic  EYES: EOMI, PERRLA, conjunctiva and sclera clear  NECK: Supple, No JVD  CHEST/LUNG: Clear to auscultation bilaterally  HEART: Regular rate and rhythm; No murmurs, rubs, or gallops  ABDOMEN: Soft, mildly tender RLQ, Nondistended; Bowel sounds present  EXTREMITIES:  2+ Peripheral Pulses, No clubbing, cyanosis, or edema  PSYCH: AAOx3  NEUROLOGY: non-focal	       Labs and Results:  Labs, Radiology, Cardiology, and Other Results:               12.8   6.13  )-----------( 233      ( 11 Jan 2019 06:23 )             37.7     01-11   141  |  101  |  15  ----------------------------<  84  4.1   |  27  |  0.9   Ca    9.6      11 Jan 2019 06:23   TPro  7.6  /  Alb  4.0  /  TBili  0.3  /  DBili  x   /  AST  22  /  ALT  29  /  AlkPhos  94  01-10           Urinalysis Basic - ( 10 Antwan 2019 12:55 )   Color: Dark Yellow / Appearance: Clear / SG: >=1.030 / pH: x  Gluc: x / Ketone: Trace  / Bili: Small / Urobili: 1.0   Blood: x / Protein: Negative / Nitrite: Negative   Leuk Esterase: Negative / RBC: x / WBC x   Sq Epi: x / Non Sq Epi: x / Bacteria: x     PT/INR - ( 09 Jan 2019 06:54 )   PT: 11.10 sec;   INR: 0.96 ratio        PTT - ( 09 Jan 2019 06:54 )  PTT:32.5 sec     < from: Xray Chest 2 Views PA/Lat (01.10.19 @ 13:39) >   Impression:     Bibasilar opacities   < end of copied text >   < from: CT Abdomen and Pelvis w/ IV Cont (01.09.19 @ 08:59) >   IMPRESSION:    Since 11/24/2018:   No CT evidence of acute intra-abdominal pathology.   Redemonstrated multifocal osseous lytic lesions consistent with  metastatic disease.   < end of copied text >   < from: MR Thoracic Spine w/wo IV Cont (12.04.18 @ 17:14) >   IMPRESSION:   1.  Multiple foci of bone marrow signal abnormality consistent with   osseous metastatic disease.   2.  Complete infiltration of the T10 vertebral body with mild (30%)   pathologic fracture and ventral epidural soft tissue component measuring   up to 5 mm in width producing severe spinal stenosis with mild spinal   cord flattening. No evidence of associated spinal cord edema.   3.  Additional lesions noted within T8, T9 and T11.   < end of copied text >

## 2019-01-11 NOTE — DISCHARGE NOTE ADULT - PLAN OF CARE
control Follow up with Oncologist and Neurosurgeon. PET CT needs to be done. Blood work was drawn during this admission. Follow up with Oncologist for results.

## 2019-01-11 NOTE — DISCHARGE NOTE ADULT - PATIENT PORTAL LINK FT
You can access the [facility] Patient Portal, offered by [facility], by registering with the following website: [email or web address]

## 2019-01-11 NOTE — DISCHARGE NOTE ADULT - CARE PROVIDER_API CALL
Kristin Fletcher), Surgical Physicians  [address] [place] [address]  [address]  [place] [address]  Phone: [phone]  Fax: [phone]    Gregory Baez), Hematology; Internal Medicine; Medical Oncology  [address] [place] [address]  Phone: [phone]  Fax: [phone]

## 2019-01-11 NOTE — CONSULT NOTE ADULT - ASSESSMENT
Pt. 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 He was sent by outpatient nuerosurgery for cancer workup. Labs are unremarkable. Previous admission PET scan shows lytic lesions in right anterior ilium and spine. CT scans of chest abdominal and pelvis are negative except know metastatic spine lesions. US testicles negative. Colonoscopy from 2016 negative per patient. Pt. 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 He was sent by outpatient neurosurgery for cancer workup. Labs are unremarkable. Previous admission PET scan shows lytic lesions in right anterior ilium and spine. CT scans of chest abdominal and pelvis are negative except known metastatic spine lesions. US testicles negative. Colonoscopy from 2016 negative per patient.    1. Metastatic cancer of unknown primary-check beta HCG, AFP, CEA, LDH, CA 19-9 prior to discharge.  Will add MMR testing to previous bone biopsy.  If deficient, patient may be a candidate for immunotherapy.  Pathology will also be sent for second opinion.  Will need outpatient PET scan.    If patient discharged, [facility] schedulers will reach out to patient to schedule appointment within next several days.

## 2019-01-12 LAB
AFP-TM SERPL-MCNC: 1.6 NG/ML — SIGNIFICANT CHANGE UP
CANCER AG19-9 SERPL-ACNC: 10.9 U/ML — SIGNIFICANT CHANGE UP
CEA SERPL-MCNC: 1.3 NG/ML — SIGNIFICANT CHANGE UP (ref 0–3.8)
HCG-TM SERPL-ACNC: <1 MIU/ML — SIGNIFICANT CHANGE UP

## 2019-01-14 ENCOUNTER — INBOUND DOCUMENT (OUTPATIENT)
Age: 64
End: 2019-01-14

## 2019-01-14 ENCOUNTER — TRANSCRIPTION ENCOUNTER (OUTPATIENT)
Age: 64
End: 2019-01-14

## 2019-01-14 PROBLEM — M54.9 DORSALGIA, UNSPECIFIED: Chronic | Status: ACTIVE | Noted: 2019-01-10

## 2019-01-14 PROBLEM — I21.9 ACUTE MYOCARDIAL INFARCTION, UNSPECIFIED: Chronic | Status: ACTIVE | Noted: 2019-01-10

## 2019-01-14 RX ORDER — OXYCODONE AND ACETAMINOPHEN 5; 325 MG/1; MG/1
5-325 TABLET ORAL
Qty: 60 | Refills: 0 | Status: ACTIVE | COMMUNITY
Start: 2018-12-13 | End: 1900-01-01

## 2019-01-15 DIAGNOSIS — M54.5 LOW BACK PAIN: ICD-10-CM

## 2019-01-15 DIAGNOSIS — I25.2 OLD MYOCARDIAL INFARCTION: ICD-10-CM

## 2019-01-15 DIAGNOSIS — C79.51 SECONDARY MALIGNANT NEOPLASM OF BONE: ICD-10-CM

## 2019-01-15 DIAGNOSIS — J44.9 CHRONIC OBSTRUCTIVE PULMONARY DISEASE, UNSPECIFIED: ICD-10-CM

## 2019-01-15 DIAGNOSIS — Z87.891 PERSONAL HISTORY OF NICOTINE DEPENDENCE: ICD-10-CM

## 2019-01-15 DIAGNOSIS — Z95.5 PRESENCE OF CORONARY ANGIOPLASTY IMPLANT AND GRAFT: ICD-10-CM

## 2019-01-15 DIAGNOSIS — R10.9 UNSPECIFIED ABDOMINAL PAIN: ICD-10-CM

## 2019-01-15 DIAGNOSIS — E78.5 HYPERLIPIDEMIA, UNSPECIFIED: ICD-10-CM

## 2019-01-15 DIAGNOSIS — I25.10 ATHEROSCLEROTIC HEART DISEASE OF NATIVE CORONARY ARTERY WITHOUT ANGINA PECTORIS: ICD-10-CM

## 2019-01-15 DIAGNOSIS — G89.3 NEOPLASM RELATED PAIN (ACUTE) (CHRONIC): ICD-10-CM

## 2019-01-15 DIAGNOSIS — F10.10 ALCOHOL ABUSE, UNCOMPLICATED: ICD-10-CM

## 2019-01-15 DIAGNOSIS — C80.1 MALIGNANT (PRIMARY) NEOPLASM, UNSPECIFIED: ICD-10-CM

## 2019-01-16 ENCOUNTER — APPOINTMENT (OUTPATIENT)
Dept: HEMATOLOGY ONCOLOGY | Facility: CLINIC | Age: 64
End: 2019-01-16

## 2019-01-16 VITALS
WEIGHT: 244 LBS | HEIGHT: 66 IN | TEMPERATURE: 97.3 F | RESPIRATION RATE: 14 BRPM | SYSTOLIC BLOOD PRESSURE: 165 MMHG | BODY MASS INDEX: 39.21 KG/M2 | DIASTOLIC BLOOD PRESSURE: 91 MMHG

## 2019-01-16 DIAGNOSIS — Z87.891 PERSONAL HISTORY OF NICOTINE DEPENDENCE: ICD-10-CM

## 2019-01-16 DIAGNOSIS — C79.51 SECONDARY MALIGNANT NEOPLASM OF BONE: ICD-10-CM

## 2019-01-16 DIAGNOSIS — Z83.42 FAMILY HISTORY OF FAMILIAL HYPERCHOLESTEROLEMIA: ICD-10-CM

## 2019-01-16 DIAGNOSIS — Z82.49 FAMILY HISTORY OF ISCHEMIC HEART DISEASE AND OTHER DISEASES OF THE CIRCULATORY SYSTEM: ICD-10-CM

## 2019-01-16 DIAGNOSIS — C80.1 SECONDARY MALIGNANT NEOPLASM OF BONE: ICD-10-CM

## 2019-01-16 DIAGNOSIS — Z86.79 PERSONAL HISTORY OF OTHER DISEASES OF THE CIRCULATORY SYSTEM: ICD-10-CM

## 2019-01-16 RX ORDER — OXYCODONE AND ACETAMINOPHEN 5; 325 MG/1; MG/1
5-325 TABLET ORAL
Qty: 18 | Refills: 0 | Status: DISCONTINUED | COMMUNITY
Start: 2018-11-26 | End: 2019-01-16

## 2019-01-16 RX ORDER — ONDANSETRON 8 MG/1
8 TABLET ORAL EVERY 8 HOURS
Qty: 30 | Refills: 3 | Status: ACTIVE | COMMUNITY
Start: 2019-01-16 | End: 1900-01-01

## 2019-01-16 RX ORDER — ALUMINUM ZIRCONIUM TETRACHLOROHYDREX GLY 0.18 G/G
43 STICK TOPICAL DAILY
Qty: 1 | Refills: 3 | Status: ACTIVE | COMMUNITY
Start: 2019-01-16 | End: 1900-01-01

## 2019-01-16 RX ORDER — OXYCODONE HYDROCHLORIDE 20 MG/1
20 TABLET, FILM COATED, EXTENDED RELEASE ORAL
Qty: 60 | Refills: 0 | Status: ACTIVE | COMMUNITY
Start: 2019-01-16 | End: 1900-01-01

## 2019-01-16 RX ORDER — OXYCODONE 10 MG/1
10 TABLET ORAL EVERY 6 HOURS
Qty: 120 | Refills: 0 | Status: ACTIVE | COMMUNITY
Start: 2019-01-16 | End: 1900-01-01

## 2019-01-16 RX ORDER — PROCHLORPERAZINE MALEATE 10 MG/1
10 TABLET ORAL EVERY 6 HOURS
Qty: 30 | Refills: 3 | Status: ACTIVE | COMMUNITY
Start: 2019-01-16 | End: 1900-01-01

## 2019-01-22 ENCOUNTER — OUTPATIENT (OUTPATIENT)
Dept: OUTPATIENT SERVICES | Facility: HOSPITAL | Age: 64
LOS: 1 days | Discharge: HOME | End: 2019-01-22

## 2019-01-22 ENCOUNTER — OTHER (OUTPATIENT)
Age: 64
End: 2019-01-22

## 2019-01-22 ENCOUNTER — APPOINTMENT (OUTPATIENT)
Dept: HEMATOLOGY ONCOLOGY | Facility: CLINIC | Age: 64
End: 2019-01-22

## 2019-01-22 DIAGNOSIS — Z98.890 OTHER SPECIFIED POSTPROCEDURAL STATES: Chronic | ICD-10-CM

## 2019-01-22 PROBLEM — C79.51 METASTASIS TO BONE OF UNKNOWN PRIMARY: Status: ACTIVE | Noted: 2018-12-17

## 2019-01-22 NOTE — CONSULT LETTER
[Dear  ___] : Dear  [unfilled], [Consult Letter:] : I had the pleasure of evaluating your patient, [unfilled]. [Please see my note below.] : Please see my note below. [Consult Closing:] : Thank you very much for allowing me to participate in the care of this patient.  If you have any questions, please do not hesitate to contact me. [Sincerely,] : Sincerely, [FreeTextEntry3] : Ignacio

## 2019-01-22 NOTE — HISTORY OF PRESENT ILLNESS
[de-identified] : This is a 63-year-old male with extensive medical history including COPD quit smoking 5 years ago, hypertension, coronary artery disease with MI status post times as well as alcohol abuse who was recently diagnosed with cancer of unknown primary with metastatic disease of the bones status post recent T10 kyphoplasty. He was sent here by neurosurgical colleagues to [facility]  due to delay in establishing care, as well as new right lower quadrant pain it appears to be related to his metastatic disease.  He was discharged from [facility] and they are her for ofllow up.\par \par in [facility]    I  went over the prognosis especially if a second opinion conference this is an adenocarcinoma the median survival times have been reported to be in approximately 10-12 months.  Serum CEA as well as CA 19-9, AFP, LDH, beta hCG all negative from hospital stay.   I spoke with Path and we are unable to send any additional testing such as NGS but they will try to obtain IHC for MMR. Rebiopsy would not help since it would be a bony specimen and decalcification once again prevent us from sending specialist sequencing as discussed with pathology team\par \par He had multiple imaging modalities, including testicular ultrasound, CT chest abdomen and pelvis with contrast as well as a bone scan.\par \par The pathology from the kyphoplasty was discussed with Dr. rPeston Hill, he suspects that it is carcinoma, most likely adenocarcinoma and given the staining for CEA maybe even pancreatic in origin. Unfortunately because it is a specimen from a bone the decalcification process will allow us to send the tumor for gene expression profile/next generation sequencing or for neutrophil receptor tyrosine kinase gene fusion.\par \par We will also send the specimen for second opinion.  \par \par RADIOLOGY\par CXR 1/10/19 Impression: Bibasilar opacities \par CT A/P 1/9/19 IMPRESSION: Since 11/24/2018: No CT evidence of acute intra-abdominal pathology. Redemonstrated multifocal osseous lytic lesions consistent with metastatic \par disease. \par MR Thoracic Spine 12/4/18 IMPRESSION:  Multiple foci of bone marrow signal abnormality consistent with osseous metastatic disease. Complete infiltration of the T10 vertebral body with mild (30%)  pathologic fracture and ventral epidural soft tissue component measuring up to 5 mm in width producing severe spinal stenosis with mild spinal cord flattening. No evidence of associated spinal cord edema.  Additional lesions noted within T8, T9 and T11. \par CT C/A/P 11/26/18 IMPRESSION:  Osteolytic lesions involving the T8 and T10 vertebral bodies.  No suspicious pulmonary mass or nodule.  Findings compatible with emphysema and coexisting interstitial lung disease.   Multifocal osseous lytic lesions as described with associated partial  pathologic destruction of the T10 vertebral body consistent with  metastatic disease. The underlying etiology is uncertain.  Further evaluation with nuclear medicine bone scan to evaluate extent of  disease is recommended. Additionally contrast-enhanced MR imaging of the  thoracic spine may be of benefit.  Mild prostatomegaly. \par Bone Scan 11/26/18 Impression: Multiple bony abnormalities which by pattern of distribution and correlation with CT abnormalities are consistent with metastatic bone disease or myeloma.Photopenic areas in the center of T10, T9, T8 corresponding with lytic lesions on CT and increased uptake on SPECT imaging in the right anterior ilium corresponding to lytic lesion on CT.  Probably degenerative changes at L4-S1 and peripheral joints \par MR Thoracic Spine 4/10/18\par MR Abdomen 6/6/2017 shows mild hepatomegaly.  No evidence of hepatic steatosis or liver mass.  Question pancreas divisum.  Small umbilical hernia which contains fat.  Small hiatal hernia.  Enhancing lesion in T10 vertebral body, which is an otherwise healthy patient with no malignancy likely represents an atypical hemangioma.\par MR Lumbar Spine 3/28/17 T2-L1 mild to moderate interspace narrowing and degenerative changes of the oposing endplates.  Minimal disc bulge with no central stenosis or foraminal stenosis.  L3-4 mild disc bulge and hypertrophy of the facets and ligamentum flavum with mild central stenosis and potential for subarticular impingement on the origins of the L4 root sleeves.  L4-5 mild disc bulge, mild facet arthrosis and ligamentum flavum hypertrophy with no central stenosis, no lateral recess or foraminal stenosis.\par MR Hip 3/28/17 shows suspect small right labral tear.  Bilateral herniation pits, large on the right.  This is probably due toacetabular over coverage (pincer-type femoral acetabular impingement). \par \par \par PATHOLOGY\par 12/28/18 Final Diagnosis T10 vertebrae: - Metastatic carcinoma.  [de-identified] : 1/16/19\par He is here with his wife. He denies any headaches, blurry vision, change in mentation.  He is taking 2 tabs every 4 hours (approx 12 tabs/day) and still having pain in the abdominal/pelvic region. Discussed risks of chemotherapy of Carboplatin + Taxol every 3 weeks, including but not limited to fatigue, nausea, vomiting, diarrhea, elevated liver enzymes, loss of appetite, mucositis, hair loss, neuropathy, allergic reactions, cytopenias, and infection to name a few.  Written information provided.  Anti-emetics ordered.\par

## 2019-01-22 NOTE — REVIEW OF SYSTEMS
[Negative] : Allergic/Immunologic [FreeTextEntry4] : patient only has 6 teeth, as per patient [FreeTextEntry7] : He complains of slight constipation and straining, he is already taking colace [FreeTextEntry9] : generalized abdominal/pelvic pain, likely from cancer

## 2019-01-22 NOTE — ASSESSMENT
[FreeTextEntry1] : #Cancer of unknown primary most likely adenocarcinoma in origin\par - Given his smoking history would suspect lung or bladder to be primary, there is some CEA staining but this is nonspecific but pancreatic may be possible as well\par - Will followup MMR status, if positive, we may  use immunotherapy \par - serum CEA as well as CA 19-9, AFP, LDH, beta hCG all negative\par - We went over the different options and decided to proceed with Carboplatin and Taxol.  We went over the side efects such as neuropathy, fatiguue, in fection, nausea and vomiting to name a few\par - we would like to start Zometa, pending dental clearance\par - I provided with a copy of those records they're considering a second opinion in [facility]\par -will get PET/CT, but unlikely to change anything, we spoke about MR brain but he decided not to proceed \par \par #Abdominal/pelvic pain suspect from referred bony pain, possibly constipation \par - c/w Percocet for breakthrough pain management\par - start Oxycontin 20mg BiD\par - will possibly arrange for radiation oncology evaluation if pain does not get better\par -we spoke about bowel regiment, Hydration, Metamucil, Miralex\par \par #Anemia, mild, may be related to malignancy\par - I recommended that he start cutting down on drinking\par \par #ETOH\par -he states he has stopped\par \par RTC after 1st cycle of chemotherapy , PET/CT to be done prior\par \par Addendum : Immunohistochemical stains have been performed using antibodies to the following mismatch repair proteins: hMLH1 (clone [id number])       Positive hMSH2 (clone [id number])     Positive hMSH6 (clone [id number])                   Positive PMS2 (clone [id number])           Positive: Addendum Immunohistochemical stains have been performed using antibodies to the following mismatch repair proteins: hMLH1 (clone [id number])       Positive hMSH2 (clone [id number])     Positive hMSH6 (clone 44)                   Positive PMS2 (clone [id number])           Positive

## 2019-01-23 ENCOUNTER — FORM ENCOUNTER (OUTPATIENT)
Age: 64
End: 2019-01-23

## 2019-01-24 ENCOUNTER — OUTPATIENT (OUTPATIENT)
Dept: OUTPATIENT SERVICES | Facility: HOSPITAL | Age: 64
LOS: 1 days | Discharge: HOME | End: 2019-01-24

## 2019-01-24 DIAGNOSIS — C79.51 SECONDARY MALIGNANT NEOPLASM OF BONE: ICD-10-CM

## 2019-01-24 DIAGNOSIS — Z98.890 OTHER SPECIFIED POSTPROCEDURAL STATES: Chronic | ICD-10-CM

## 2019-01-24 LAB — GLUCOSE BLDC GLUCOMTR-MCNC: 115 MG/DL — HIGH (ref 70–99)

## 2019-01-28 DIAGNOSIS — C79.51 SECONDARY MALIGNANT NEOPLASM OF BONE: ICD-10-CM

## 2019-01-29 ENCOUNTER — APPOINTMENT (OUTPATIENT)
Dept: INFUSION THERAPY | Facility: CLINIC | Age: 64
End: 2019-01-29

## 2019-02-05 DIAGNOSIS — M54.5 LOW BACK PAIN: ICD-10-CM

## 2019-02-05 DIAGNOSIS — C80.1 MALIGNANT (PRIMARY) NEOPLASM, UNSPECIFIED: ICD-10-CM

## 2019-02-06 ENCOUNTER — INPATIENT (INPATIENT)
Facility: HOSPITAL | Age: 64
LOS: 5 days | Discharge: ANOTHER HOSPITAL | End: 2019-02-12
Attending: PHYSICAL MEDICINE & REHABILITATION | Admitting: PHYSICAL MEDICINE & REHABILITATION

## 2019-02-06 ENCOUNTER — TRANSCRIPTION ENCOUNTER (OUTPATIENT)
Age: 64
End: 2019-02-06

## 2019-02-06 DIAGNOSIS — Z98.890 OTHER SPECIFIED POSTPROCEDURAL STATES: Chronic | ICD-10-CM

## 2019-02-06 LAB
APPEARANCE UR: CLEAR — SIGNIFICANT CHANGE UP
BILIRUB UR-MCNC: NEGATIVE — SIGNIFICANT CHANGE UP
COLOR SPEC: YELLOW — SIGNIFICANT CHANGE UP
DIFF PNL FLD: NEGATIVE — SIGNIFICANT CHANGE UP
GLUCOSE BLDC GLUCOMTR-MCNC: 138 MG/DL — HIGH (ref 70–99)
GLUCOSE UR QL: NEGATIVE MG/DL — SIGNIFICANT CHANGE UP
KETONES UR-MCNC: NEGATIVE — SIGNIFICANT CHANGE UP
LEUKOCYTE ESTERASE UR-ACNC: NEGATIVE — SIGNIFICANT CHANGE UP
NITRITE UR-MCNC: NEGATIVE — SIGNIFICANT CHANGE UP
PH UR: 6.5 — SIGNIFICANT CHANGE UP (ref 5–8)
PROT UR-MCNC: NEGATIVE MG/DL — SIGNIFICANT CHANGE UP
SP GR SPEC: 1.02 — SIGNIFICANT CHANGE UP (ref 1.01–1.03)
UROBILINOGEN FLD QL: 1 MG/DL (ref 0.2–0.2)

## 2019-02-06 RX ORDER — INSULIN ASPART 100 [IU]/ML
1 INJECTION, SOLUTION SUBCUTANEOUS
Qty: 0 | Refills: 0 | COMMUNITY

## 2019-02-06 RX ORDER — DEXAMETHASONE 0.5 MG/5ML
1 ELIXIR ORAL
Qty: 0 | Refills: 0 | COMMUNITY

## 2019-02-06 RX ORDER — DOCUSATE SODIUM 100 MG
100 CAPSULE ORAL THREE TIMES A DAY
Qty: 0 | Refills: 0 | Status: DISCONTINUED | OUTPATIENT
Start: 2019-02-06 | End: 2019-02-10

## 2019-02-06 RX ORDER — ATORVASTATIN CALCIUM 80 MG/1
1 TABLET, FILM COATED ORAL
Qty: 0 | Refills: 0 | COMMUNITY

## 2019-02-06 RX ORDER — SODIUM CHLORIDE 9 MG/ML
1000 INJECTION, SOLUTION INTRAVENOUS
Qty: 0 | Refills: 0 | Status: DISCONTINUED | OUTPATIENT
Start: 2019-02-06 | End: 2019-02-12

## 2019-02-06 RX ORDER — DEXTROSE 50 % IN WATER 50 %
25 SYRINGE (ML) INTRAVENOUS ONCE
Qty: 0 | Refills: 0 | Status: DISCONTINUED | OUTPATIENT
Start: 2019-02-06 | End: 2019-02-12

## 2019-02-06 RX ORDER — DEXAMETHASONE 0.5 MG/5ML
4 ELIXIR ORAL EVERY 12 HOURS
Qty: 0 | Refills: 0 | Status: COMPLETED | OUTPATIENT
Start: 2019-02-06 | End: 2019-02-06

## 2019-02-06 RX ORDER — DEXAMETHASONE 0.5 MG/5ML
2 ELIXIR ORAL
Qty: 0 | Refills: 0 | COMMUNITY

## 2019-02-06 RX ORDER — DOCUSATE SODIUM 100 MG
1 CAPSULE ORAL
Qty: 0 | Refills: 0 | COMMUNITY

## 2019-02-06 RX ORDER — INSULIN LISPRO 100/ML
VIAL (ML) SUBCUTANEOUS
Qty: 0 | Refills: 0 | Status: DISCONTINUED | OUTPATIENT
Start: 2019-02-06 | End: 2019-02-06

## 2019-02-06 RX ORDER — PANTOPRAZOLE SODIUM 20 MG/1
40 TABLET, DELAYED RELEASE ORAL
Qty: 0 | Refills: 0 | Status: DISCONTINUED | OUTPATIENT
Start: 2019-02-06 | End: 2019-02-12

## 2019-02-06 RX ORDER — LIDOCAINE 4 G/100G
1 CREAM TOPICAL DAILY
Qty: 0 | Refills: 0 | Status: DISCONTINUED | OUTPATIENT
Start: 2019-02-06 | End: 2019-02-12

## 2019-02-06 RX ORDER — GLUCAGON INJECTION, SOLUTION 0.5 MG/.1ML
1 INJECTION, SOLUTION SUBCUTANEOUS ONCE
Qty: 0 | Refills: 0 | Status: DISCONTINUED | OUTPATIENT
Start: 2019-02-06 | End: 2019-02-12

## 2019-02-06 RX ORDER — ENOXAPARIN SODIUM 100 MG/ML
110 INJECTION SUBCUTANEOUS
Qty: 0 | Refills: 0 | COMMUNITY

## 2019-02-06 RX ORDER — OXYCODONE HYDROCHLORIDE 5 MG/1
1 TABLET ORAL
Qty: 0 | Refills: 0 | COMMUNITY

## 2019-02-06 RX ORDER — ENOXAPARIN SODIUM 100 MG/ML
110 INJECTION SUBCUTANEOUS EVERY 12 HOURS
Qty: 0 | Refills: 0 | Status: ACTIVE | OUTPATIENT
Start: 2019-02-06 | End: 2020-01-05

## 2019-02-06 RX ORDER — OXYCODONE HYDROCHLORIDE 5 MG/1
5 TABLET ORAL ONCE
Qty: 0 | Refills: 0 | Status: DISCONTINUED | OUTPATIENT
Start: 2019-02-06 | End: 2019-02-06

## 2019-02-06 RX ORDER — BACLOFEN 100 %
5 POWDER (GRAM) MISCELLANEOUS EVERY 8 HOURS
Qty: 0 | Refills: 0 | Status: DISCONTINUED | OUTPATIENT
Start: 2019-02-06 | End: 2019-02-12

## 2019-02-06 RX ORDER — DEXTROSE 50 % IN WATER 50 %
15 SYRINGE (ML) INTRAVENOUS ONCE
Qty: 0 | Refills: 0 | Status: DISCONTINUED | OUTPATIENT
Start: 2019-02-06 | End: 2019-02-12

## 2019-02-06 RX ORDER — DEXAMETHASONE 0.5 MG/5ML
2 ELIXIR ORAL EVERY 12 HOURS
Qty: 0 | Refills: 0 | Status: COMPLETED | OUTPATIENT
Start: 2019-02-07 | End: 2019-02-08

## 2019-02-06 RX ORDER — DEXTROSE 50 % IN WATER 50 %
12.5 SYRINGE (ML) INTRAVENOUS ONCE
Qty: 0 | Refills: 0 | Status: DISCONTINUED | OUTPATIENT
Start: 2019-02-06 | End: 2019-02-12

## 2019-02-06 RX ORDER — ACETAMINOPHEN 500 MG
3 TABLET ORAL
Qty: 0 | Refills: 0 | COMMUNITY

## 2019-02-06 RX ORDER — LANOLIN ALCOHOL/MO/W.PET/CERES
1 CREAM (GRAM) TOPICAL
Qty: 0 | Refills: 0 | COMMUNITY

## 2019-02-06 RX ORDER — BACLOFEN 100 %
1 POWDER (GRAM) MISCELLANEOUS
Qty: 0 | Refills: 0 | COMMUNITY

## 2019-02-06 RX ORDER — ASPIRIN/CALCIUM CARB/MAGNESIUM 324 MG
81 TABLET ORAL DAILY
Qty: 0 | Refills: 0 | Status: DISCONTINUED | OUTPATIENT
Start: 2019-02-06 | End: 2019-02-12

## 2019-02-06 RX ORDER — TAMSULOSIN HYDROCHLORIDE 0.4 MG/1
0.4 CAPSULE ORAL AT BEDTIME
Qty: 0 | Refills: 0 | Status: DISCONTINUED | OUTPATIENT
Start: 2019-02-06 | End: 2019-02-12

## 2019-02-06 RX ORDER — SENNA PLUS 8.6 MG/1
2 TABLET ORAL AT BEDTIME
Qty: 0 | Refills: 0 | Status: DISCONTINUED | OUTPATIENT
Start: 2019-02-06 | End: 2019-02-08

## 2019-02-06 RX ORDER — OXYCODONE HYDROCHLORIDE 5 MG/1
5 TABLET ORAL EVERY 4 HOURS
Qty: 0 | Refills: 0 | Status: DISCONTINUED | OUTPATIENT
Start: 2019-02-06 | End: 2019-02-11

## 2019-02-06 RX ORDER — LIDOCAINE 4 G/100G
1 CREAM TOPICAL
Qty: 0 | Refills: 0 | COMMUNITY

## 2019-02-06 RX ORDER — ACETAMINOPHEN 500 MG
975 TABLET ORAL EVERY 8 HOURS
Qty: 0 | Refills: 0 | Status: DISCONTINUED | OUTPATIENT
Start: 2019-02-06 | End: 2019-02-08

## 2019-02-06 RX ORDER — PANTOPRAZOLE SODIUM 20 MG/1
1 TABLET, DELAYED RELEASE ORAL
Qty: 0 | Refills: 0 | COMMUNITY

## 2019-02-06 RX ORDER — DEXAMETHASONE 0.5 MG/5ML
2 ELIXIR ORAL DAILY
Qty: 0 | Refills: 0 | Status: COMPLETED | OUTPATIENT
Start: 2019-02-09 | End: 2019-02-10

## 2019-02-06 RX ORDER — SENNA PLUS 8.6 MG/1
1 TABLET ORAL
Qty: 0 | Refills: 0 | COMMUNITY

## 2019-02-06 RX ORDER — LANOLIN ALCOHOL/MO/W.PET/CERES
5 CREAM (GRAM) TOPICAL AT BEDTIME
Qty: 0 | Refills: 0 | Status: DISCONTINUED | OUTPATIENT
Start: 2019-02-06 | End: 2019-02-12

## 2019-02-06 RX ORDER — TAMSULOSIN HYDROCHLORIDE 0.4 MG/1
1 CAPSULE ORAL
Qty: 0 | Refills: 0 | COMMUNITY

## 2019-02-06 RX ADMIN — OXYCODONE HYDROCHLORIDE 5 MILLIGRAM(S): 5 TABLET ORAL at 19:11

## 2019-02-06 RX ADMIN — OXYCODONE HYDROCHLORIDE 5 MILLIGRAM(S): 5 TABLET ORAL at 23:17

## 2019-02-06 RX ADMIN — Medication 4 MILLIGRAM(S): at 19:03

## 2019-02-06 RX ADMIN — Medication 100 MILLIGRAM(S): at 21:02

## 2019-02-06 RX ADMIN — SENNA PLUS 2 TABLET(S): 8.6 TABLET ORAL at 21:03

## 2019-02-06 RX ADMIN — OXYCODONE HYDROCHLORIDE 5 MILLIGRAM(S): 5 TABLET ORAL at 23:19

## 2019-02-06 RX ADMIN — Medication 975 MILLIGRAM(S): at 21:02

## 2019-02-06 RX ADMIN — ENOXAPARIN SODIUM 110 MILLIGRAM(S): 100 INJECTION SUBCUTANEOUS at 19:05

## 2019-02-06 RX ADMIN — Medication 5 MILLIGRAM(S): at 21:03

## 2019-02-06 RX ADMIN — TAMSULOSIN HYDROCHLORIDE 0.4 MILLIGRAM(S): 0.4 CAPSULE ORAL at 21:02

## 2019-02-06 NOTE — DISCHARGE NOTE ADULT - PATIENT PORTAL LINK FT
You can access the [facility] Patient Portal, offered by [facility], by registering with the following website: [email or web address]

## 2019-02-06 NOTE — DISCHARGE NOTE ADULT - PLAN OF CARE
hx of thoracic spine mets w/ kyphoplasty on 1/30/19 with acute worsening of BLE paraparesis MRI T/L spine repeated on 2/11/19 with postoperative hematoma  plan to transfer back to neurosurgery at original hospital of surgery

## 2019-02-06 NOTE — DISCHARGE NOTE ADULT - HOSPITAL COURSE
Pt is 62 yo male with hx of thoracic spine mets (primary unknown), with T10 compression fracture, - pathologic w/ epidural extension, resulting in spinal cord compression. Underwent on 1/30/19 T10-11 laminectomy at [facility] and transferred to acute rehab at [facility]  on 2/6/19. Completed decadron taper on 2/10 and noted to have worsening weakness of his BLE on 2/11. Repeat T/L spine MRI on 2/11 noted to have postoperative hematoma in area of prior surgery with spinal cord compression. Noted to have drop in hgb from about 11 to 9. STarted on IV decadron 6mg every 6 hours given one dose today on 2/11. Lovenox stopped.     Plan to transfer to [facility] for further care. Patient 's last meal about noon on 2/11/19, discharged with IV fluids and indwelling howell placed on 2/11/19 Pt is 62 yo male with hx of thoracic spine mets (primary unknown), with T10 compression fracture, - pathologic w/ epidural extension, resulting in spinal cord compression. Underwent on 1/30/19 T10-11 laminectomy at [facility] and transferred to acute rehab at [facility]  on 2/6/19. Completed decadron taper on 2/10 and noted to have worsening weakness of his BLE on 2/11. Repeat T/L spine MRI on 2/11 noted to have postoperative hematoma in area of prior surgery with spinal cord compression. Noted to have drop in hgb from about 11 to 9. STarted on IV decadron 6mg every 6 hours given one dose today on 2/11. Lovenox stopped.     Plan to transfer to [facility] for further care. Patient 's last meal about noon on 2/11/19, discharged with IV fluids and indwelling howell placed on 2/11/19  MRI report and discs accompanying patient. Pt is 64 yo male with hx of thoracic spine mets (primary unknown), with T10 compression fracture, - pathologic w/ epidural extension, resulting in spinal cord compression. Underwent on 1/30/19 T10-11 laminectomy at [facility] and transferred to acute rehab at [facility]  on 2/6/19. Completed decadron taper on 2/10 and noted to have worsening weakness of his BLE on 2/11. Repeat T/L spine MRI on 2/11 noted to have postoperative hematoma in area of prior surgery with spinal cord compression. Noted to have drop in hgb from about 11 to 9. STarted on IV decadron 6mg every 6 hours given one dose today on 2/11. Lovenox stopped.     Plan to transfer to [facility] (Dr. Huggins - Accepting Physician, Neuro ICU and Neurosurgery services) for further care. Patient 's last meal about noon on 2/11/19, discharged with IV fluids and indwelling howell placed on 2/11/19  MRI report and discs accompanying patient.

## 2019-02-06 NOTE — DISCHARGE NOTE ADULT - MEDICATION SUMMARY - MEDICATIONS TO STOP TAKING
I will STOP taking the medications listed below when I get home from the hospital:    Lipitor 20 mg oral tablet  -- 1 tab(s) by mouth once a day    cyclobenzaprine 10 mg oral tablet  -- 1 tab(s) by mouth every 8 hours   -- May cause drowsiness.  Alcohol may intensify this effect.  Use care when operating dangerous machinery.  Obtain medical advice before taking any non-prescription drugs as some may affect the action of this medication.    dexamethasone 2 mg oral tablet  -- 2 tab(s) by mouth every 12 hours    enoxaparin  -- 110 milligram(s) injectable every 12 hours    lidocaine 5% topical film  -- Apply on skin to affected area once a day for 12 hours on and 12 hours off as needed.    Melatonin 5 mg oral tablet  -- 1 tab(s) by mouth once a day (at bedtime), As Needed for insomnia, HOLD for sedation    oxyCODONE 5 mg oral tablet  -- 1 tab(s) by mouth every 4 hours, As Needed for moderate-severe pain with MDD 12 pills    pantoprazole 40 mg oral delayed release tablet  -- 1 tab(s) by mouth once a day before breakfast    Senna 8.6 mg oral tablet  -- 1 tab(s) by mouth once a day (at bedtime)    tamsulosin 0.4 mg oral capsule  -- 1 cap(s) by mouth once a day    Artificial Tears ophthalmic solution  -- 1 drop(s) to each affected eye every 4 hours    insulin aspart 100 units/mL subcutaneous solution  -- 1 application subcutaneous once a day, As Needed as per insulin sliding scale protocol

## 2019-02-06 NOTE — DISCHARGE NOTE ADULT - MEDICATION SUMMARY - MEDICATIONS TO TAKE
I will START or STAY ON the medications listed below when I get home from the hospital:    dexamethasone  -- 6 milligram(s) intravenously every 6 hours  -- Indication: For worsening weakness    aspirin 81 mg oral delayed release tablet  -- 1 tab(s) by mouth once a day  -- Indication: For cad    acetaminophen 325 mg oral tablet  -- 3 tab(s) by mouth every 8 hours  -- Indication: For PAin     tamsulosin 0.4 mg oral capsule  -- 1 cap(s) by mouth once a day (at bedtime)  -- Indication: For bph    insulin aspart 100 units/mL subcutaneous solution  -- 1 application subcutaneous once a day, As Needed as per insulin sliding scale protocol  -- Indication: For dm    Colace 100 mg oral capsule  -- 1 tab(s) by mouth 3 times a day  -- Indication: For constipation    polyethylene glycol 3350 oral powder for reconstitution  -- 17 gram(s) by mouth once a day  -- Indication: For constipation    senna oral tablet  -- 2 tab(s) by mouth once a day  -- Indication: For constipation    baclofen 5 mg oral tablet  -- 1 tab(s) by mouth every 8 hours  -- Indication: For spasticity    melatonin 5 mg oral tablet  -- 1 tab(s) by mouth once a day (at bedtime), As needed, Insomnia  -- Indication: For insomnia    Artificial Tears ophthalmic solution  -- 1 drop(s) to each affected eye every 4 hours  -- Indication: For dry eyes    pantoprazole 40 mg oral delayed release tablet  -- 1 tab(s) by mouth once a day (before a meal)  -- Indication: For gerd

## 2019-02-06 NOTE — DISCHARGE NOTE ADULT - CARE PLAN
Principal Discharge DX:	Paraparesis of both lower limbs  Goal:	hx of thoracic spine mets w/ kyphoplasty on 1/30/19 with acute worsening of BLE paraparesis  Assessment and plan of treatment:	MRI T/L spine repeated on 2/11/19 with postoperative hematoma  plan to transfer back to neurosurgery at original hospital of surgery  Secondary Diagnosis:	S/P kyphoplasty

## 2019-02-06 NOTE — DISCHARGE NOTE ADULT - MEDICATION SUMMARY - MEDICATIONS TO CHANGE
I will SWITCH the dose or number of times a day I take the medications listed below when I get home from the hospital:    aspirin 81 mg oral delayed release tablet  -- 1 tab(s) by mouth once a day    Percocet 5/325 oral tablet  -- 1 tab(s) by mouth every 6 hours, As Needed pain    acetaminophen 325 mg oral tablet  -- 3 tab(s) by mouth every 8 hours    baclofen 5 mg oral tablet  -- 1 tab(s) by mouth 3 times a day    bisacodyl 5 mg oral delayed release tablet  -- 1 tab(s) by mouth once a day    Colace 100 mg oral capsule  -- 1 tab(s) by mouth 3 times a day

## 2019-02-07 LAB
ALBUMIN SERPL ELPH-MCNC: 3.4 G/DL — LOW (ref 3.5–5.2)
ALP SERPL-CCNC: 66 U/L — SIGNIFICANT CHANGE UP (ref 30–115)
ALT FLD-CCNC: 37 U/L — SIGNIFICANT CHANGE UP (ref 0–41)
ANION GAP SERPL CALC-SCNC: 16 MMOL/L — HIGH (ref 7–14)
AST SERPL-CCNC: 29 U/L — SIGNIFICANT CHANGE UP (ref 0–41)
BASOPHILS # BLD AUTO: 0.03 K/UL — SIGNIFICANT CHANGE UP (ref 0–0.2)
BASOPHILS NFR BLD AUTO: 0.2 % — SIGNIFICANT CHANGE UP (ref 0–1)
BILIRUB SERPL-MCNC: 0.4 MG/DL — SIGNIFICANT CHANGE UP (ref 0.2–1.2)
BUN SERPL-MCNC: 14 MG/DL — SIGNIFICANT CHANGE UP (ref 10–20)
CALCIUM SERPL-MCNC: 8.6 MG/DL — SIGNIFICANT CHANGE UP (ref 8.5–10.1)
CHLORIDE SERPL-SCNC: 94 MMOL/L — LOW (ref 98–110)
CO2 SERPL-SCNC: 24 MMOL/L — SIGNIFICANT CHANGE UP (ref 17–32)
CREAT SERPL-MCNC: 0.5 MG/DL — LOW (ref 0.7–1.5)
EOSINOPHIL # BLD AUTO: 0.05 K/UL — SIGNIFICANT CHANGE UP (ref 0–0.7)
EOSINOPHIL NFR BLD AUTO: 0.4 % — SIGNIFICANT CHANGE UP (ref 0–8)
ESTIMATED AVERAGE GLUCOSE: 123 MG/DL — HIGH (ref 68–114)
GLUCOSE BLDC GLUCOMTR-MCNC: 107 MG/DL — HIGH (ref 70–99)
GLUCOSE BLDC GLUCOMTR-MCNC: 119 MG/DL — HIGH (ref 70–99)
GLUCOSE SERPL-MCNC: 89 MG/DL — SIGNIFICANT CHANGE UP (ref 70–99)
HBA1C BLD-MCNC: 5.9 % — HIGH (ref 4–5.6)
HCT VFR BLD CALC: 34.5 % — LOW (ref 42–52)
HGB BLD-MCNC: 11.5 G/DL — LOW (ref 14–18)
IMM GRANULOCYTES NFR BLD AUTO: 3.1 % — HIGH (ref 0.1–0.3)
LYMPHOCYTES # BLD AUTO: 15.7 % — LOW (ref 20.5–51.1)
LYMPHOCYTES # BLD AUTO: 2.19 K/UL — SIGNIFICANT CHANGE UP (ref 1.2–3.4)
MAGNESIUM SERPL-MCNC: 2 MG/DL — SIGNIFICANT CHANGE UP (ref 1.8–2.4)
MCHC RBC-ENTMCNC: 27.5 PG — SIGNIFICANT CHANGE UP (ref 27–31)
MCHC RBC-ENTMCNC: 33.3 G/DL — SIGNIFICANT CHANGE UP (ref 32–37)
MCV RBC AUTO: 82.5 FL — SIGNIFICANT CHANGE UP (ref 80–94)
MONOCYTES # BLD AUTO: 0.92 K/UL — HIGH (ref 0.1–0.6)
MONOCYTES NFR BLD AUTO: 6.6 % — SIGNIFICANT CHANGE UP (ref 1.7–9.3)
NEUTROPHILS # BLD AUTO: 10.3 K/UL — HIGH (ref 1.4–6.5)
NEUTROPHILS NFR BLD AUTO: 74 % — SIGNIFICANT CHANGE UP (ref 42.2–75.2)
PHOSPHATE SERPL-MCNC: 4.1 MG/DL — SIGNIFICANT CHANGE UP (ref 2.1–4.9)
PLATELET # BLD AUTO: 246 K/UL — SIGNIFICANT CHANGE UP (ref 130–400)
POTASSIUM SERPL-MCNC: 4.8 MMOL/L — SIGNIFICANT CHANGE UP (ref 3.5–5)
POTASSIUM SERPL-SCNC: 4.8 MMOL/L — SIGNIFICANT CHANGE UP (ref 3.5–5)
PROT SERPL-MCNC: 6 G/DL — SIGNIFICANT CHANGE UP (ref 6–8)
RBC # BLD: 4.18 M/UL — LOW (ref 4.7–6.1)
RBC # FLD: 13.8 % — SIGNIFICANT CHANGE UP (ref 11.5–14.5)
SODIUM SERPL-SCNC: 134 MMOL/L — LOW (ref 135–146)
WBC # BLD: 13.92 K/UL — HIGH (ref 4.8–10.8)
WBC # FLD AUTO: 13.92 K/UL — HIGH (ref 4.8–10.8)

## 2019-02-07 RX ORDER — OXYCODONE HYDROCHLORIDE 5 MG/1
10 TABLET ORAL EVERY 6 HOURS
Qty: 0 | Refills: 0 | Status: DISCONTINUED | OUTPATIENT
Start: 2019-02-07 | End: 2019-02-07

## 2019-02-07 RX ORDER — OXYCODONE HYDROCHLORIDE 5 MG/1
5 TABLET ORAL ONCE
Qty: 0 | Refills: 0 | Status: DISCONTINUED | OUTPATIENT
Start: 2019-02-07 | End: 2019-02-07

## 2019-02-07 RX ORDER — OXYCODONE HYDROCHLORIDE 5 MG/1
10 TABLET ORAL EVERY 4 HOURS
Qty: 0 | Refills: 0 | Status: DISCONTINUED | OUTPATIENT
Start: 2019-02-07 | End: 2019-02-08

## 2019-02-07 RX ADMIN — Medication 2 MILLIGRAM(S): at 05:17

## 2019-02-07 RX ADMIN — TAMSULOSIN HYDROCHLORIDE 0.4 MILLIGRAM(S): 0.4 CAPSULE ORAL at 21:35

## 2019-02-07 RX ADMIN — OXYCODONE HYDROCHLORIDE 10 MILLIGRAM(S): 5 TABLET ORAL at 22:15

## 2019-02-07 RX ADMIN — Medication 975 MILLIGRAM(S): at 14:20

## 2019-02-07 RX ADMIN — OXYCODONE HYDROCHLORIDE 5 MILLIGRAM(S): 5 TABLET ORAL at 03:35

## 2019-02-07 RX ADMIN — Medication 5 MILLIGRAM(S): at 21:35

## 2019-02-07 RX ADMIN — OXYCODONE HYDROCHLORIDE 5 MILLIGRAM(S): 5 TABLET ORAL at 03:42

## 2019-02-07 RX ADMIN — OXYCODONE HYDROCHLORIDE 10 MILLIGRAM(S): 5 TABLET ORAL at 10:42

## 2019-02-07 RX ADMIN — Medication 100 MILLIGRAM(S): at 14:20

## 2019-02-07 RX ADMIN — Medication 5 MILLIGRAM(S): at 05:17

## 2019-02-07 RX ADMIN — OXYCODONE HYDROCHLORIDE 10 MILLIGRAM(S): 5 TABLET ORAL at 21:31

## 2019-02-07 RX ADMIN — Medication 2 MILLIGRAM(S): at 17:37

## 2019-02-07 RX ADMIN — Medication 975 MILLIGRAM(S): at 18:41

## 2019-02-07 RX ADMIN — Medication 81 MILLIGRAM(S): at 12:48

## 2019-02-07 RX ADMIN — Medication 975 MILLIGRAM(S): at 13:06

## 2019-02-07 RX ADMIN — Medication 5 MILLIGRAM(S): at 13:07

## 2019-02-07 RX ADMIN — OXYCODONE HYDROCHLORIDE 10 MILLIGRAM(S): 5 TABLET ORAL at 16:19

## 2019-02-07 RX ADMIN — ENOXAPARIN SODIUM 110 MILLIGRAM(S): 100 INJECTION SUBCUTANEOUS at 17:39

## 2019-02-07 RX ADMIN — OXYCODONE HYDROCHLORIDE 5 MILLIGRAM(S): 5 TABLET ORAL at 18:41

## 2019-02-07 RX ADMIN — Medication 975 MILLIGRAM(S): at 22:58

## 2019-02-07 RX ADMIN — LIDOCAINE 1 PATCH: 4 CREAM TOPICAL at 20:33

## 2019-02-07 RX ADMIN — Medication 100 MILLIGRAM(S): at 05:18

## 2019-02-07 RX ADMIN — ENOXAPARIN SODIUM 110 MILLIGRAM(S): 100 INJECTION SUBCUTANEOUS at 05:18

## 2019-02-07 RX ADMIN — Medication 975 MILLIGRAM(S): at 05:17

## 2019-02-07 RX ADMIN — OXYCODONE HYDROCHLORIDE 10 MILLIGRAM(S): 5 TABLET ORAL at 18:41

## 2019-02-07 RX ADMIN — SENNA PLUS 2 TABLET(S): 8.6 TABLET ORAL at 21:35

## 2019-02-07 RX ADMIN — Medication 100 MILLIGRAM(S): at 21:35

## 2019-02-07 RX ADMIN — PANTOPRAZOLE SODIUM 40 MILLIGRAM(S): 20 TABLET, DELAYED RELEASE ORAL at 06:19

## 2019-02-07 RX ADMIN — OXYCODONE HYDROCHLORIDE 10 MILLIGRAM(S): 5 TABLET ORAL at 16:18

## 2019-02-08 LAB — TSH SERPL-MCNC: 1.07 UIU/ML — SIGNIFICANT CHANGE UP (ref 0.27–4.2)

## 2019-02-08 RX ORDER — POLYETHYLENE GLYCOL 3350 17 G/17G
17 POWDER, FOR SOLUTION ORAL DAILY
Qty: 0 | Refills: 0 | Status: DISCONTINUED | OUTPATIENT
Start: 2019-02-08 | End: 2019-02-12

## 2019-02-08 RX ORDER — SENNA PLUS 8.6 MG/1
2 TABLET ORAL DAILY
Qty: 0 | Refills: 0 | Status: DISCONTINUED | OUTPATIENT
Start: 2019-02-09 | End: 2019-02-12

## 2019-02-08 RX ORDER — OXYCODONE HYDROCHLORIDE 5 MG/1
20 TABLET ORAL
Qty: 0 | Refills: 0 | Status: DISCONTINUED | OUTPATIENT
Start: 2019-02-08 | End: 2019-02-12

## 2019-02-08 RX ORDER — OXYCODONE HYDROCHLORIDE 5 MG/1
20 TABLET ORAL EVERY 4 HOURS
Qty: 0 | Refills: 0 | Status: DISCONTINUED | OUTPATIENT
Start: 2019-02-08 | End: 2019-02-12

## 2019-02-08 RX ORDER — OXYCODONE HYDROCHLORIDE 5 MG/1
5 TABLET ORAL EVERY 4 HOURS
Qty: 0 | Refills: 0 | Status: DISCONTINUED | OUTPATIENT
Start: 2019-02-08 | End: 2019-02-12

## 2019-02-08 RX ORDER — OXYCODONE HYDROCHLORIDE 5 MG/1
10 TABLET ORAL EVERY 4 HOURS
Qty: 0 | Refills: 0 | Status: DISCONTINUED | OUTPATIENT
Start: 2019-02-08 | End: 2019-02-12

## 2019-02-08 RX ADMIN — OXYCODONE HYDROCHLORIDE 10 MILLIGRAM(S): 5 TABLET ORAL at 18:25

## 2019-02-08 RX ADMIN — LIDOCAINE 1 PATCH: 4 CREAM TOPICAL at 10:26

## 2019-02-08 RX ADMIN — Medication 975 MILLIGRAM(S): at 13:49

## 2019-02-08 RX ADMIN — OXYCODONE HYDROCHLORIDE 10 MILLIGRAM(S): 5 TABLET ORAL at 22:20

## 2019-02-08 RX ADMIN — Medication 2 MILLIGRAM(S): at 17:44

## 2019-02-08 RX ADMIN — Medication 975 MILLIGRAM(S): at 05:38

## 2019-02-08 RX ADMIN — OXYCODONE HYDROCHLORIDE 10 MILLIGRAM(S): 5 TABLET ORAL at 21:49

## 2019-02-08 RX ADMIN — Medication 2 MILLIGRAM(S): at 05:10

## 2019-02-08 RX ADMIN — OXYCODONE HYDROCHLORIDE 10 MILLIGRAM(S): 5 TABLET ORAL at 06:36

## 2019-02-08 RX ADMIN — Medication 10 MILLIGRAM(S): at 21:31

## 2019-02-08 RX ADMIN — Medication 975 MILLIGRAM(S): at 01:29

## 2019-02-08 RX ADMIN — LIDOCAINE 1 PATCH: 4 CREAM TOPICAL at 06:35

## 2019-02-08 RX ADMIN — Medication 100 MILLIGRAM(S): at 13:05

## 2019-02-08 RX ADMIN — OXYCODONE HYDROCHLORIDE 10 MILLIGRAM(S): 5 TABLET ORAL at 17:47

## 2019-02-08 RX ADMIN — Medication 975 MILLIGRAM(S): at 05:11

## 2019-02-08 RX ADMIN — Medication 100 MILLIGRAM(S): at 05:11

## 2019-02-08 RX ADMIN — OXYCODONE HYDROCHLORIDE 10 MILLIGRAM(S): 5 TABLET ORAL at 13:50

## 2019-02-08 RX ADMIN — POLYETHYLENE GLYCOL 3350 17 GRAM(S): 17 POWDER, FOR SOLUTION ORAL at 17:47

## 2019-02-08 RX ADMIN — Medication 5 MILLIGRAM(S): at 21:32

## 2019-02-08 RX ADMIN — OXYCODONE HYDROCHLORIDE 10 MILLIGRAM(S): 5 TABLET ORAL at 01:31

## 2019-02-08 RX ADMIN — ENOXAPARIN SODIUM 110 MILLIGRAM(S): 100 INJECTION SUBCUTANEOUS at 05:11

## 2019-02-08 RX ADMIN — ENOXAPARIN SODIUM 110 MILLIGRAM(S): 100 INJECTION SUBCUTANEOUS at 17:48

## 2019-02-08 RX ADMIN — Medication 5 MILLIGRAM(S): at 18:58

## 2019-02-08 RX ADMIN — OXYCODONE HYDROCHLORIDE 10 MILLIGRAM(S): 5 TABLET ORAL at 10:26

## 2019-02-08 RX ADMIN — OXYCODONE HYDROCHLORIDE 10 MILLIGRAM(S): 5 TABLET ORAL at 13:05

## 2019-02-08 RX ADMIN — OXYCODONE HYDROCHLORIDE 10 MILLIGRAM(S): 5 TABLET ORAL at 05:35

## 2019-02-08 RX ADMIN — Medication 100 MILLIGRAM(S): at 21:32

## 2019-02-08 RX ADMIN — TAMSULOSIN HYDROCHLORIDE 0.4 MILLIGRAM(S): 0.4 CAPSULE ORAL at 21:32

## 2019-02-08 RX ADMIN — Medication 5 MILLIGRAM(S): at 05:09

## 2019-02-08 RX ADMIN — Medication 81 MILLIGRAM(S): at 13:06

## 2019-02-08 RX ADMIN — OXYCODONE HYDROCHLORIDE 10 MILLIGRAM(S): 5 TABLET ORAL at 09:01

## 2019-02-08 RX ADMIN — OXYCODONE HYDROCHLORIDE 10 MILLIGRAM(S): 5 TABLET ORAL at 02:58

## 2019-02-08 RX ADMIN — PANTOPRAZOLE SODIUM 40 MILLIGRAM(S): 20 TABLET, DELAYED RELEASE ORAL at 05:11

## 2019-02-08 RX ADMIN — Medication 975 MILLIGRAM(S): at 13:04

## 2019-02-08 RX ADMIN — Medication 5 MILLIGRAM(S): at 13:06

## 2019-02-09 RX ADMIN — Medication 5 MILLIGRAM(S): at 13:03

## 2019-02-09 RX ADMIN — OXYCODONE HYDROCHLORIDE 10 MILLIGRAM(S): 5 TABLET ORAL at 15:55

## 2019-02-09 RX ADMIN — OXYCODONE HYDROCHLORIDE 10 MILLIGRAM(S): 5 TABLET ORAL at 20:22

## 2019-02-09 RX ADMIN — Medication 81 MILLIGRAM(S): at 11:10

## 2019-02-09 RX ADMIN — OXYCODONE HYDROCHLORIDE 10 MILLIGRAM(S): 5 TABLET ORAL at 01:49

## 2019-02-09 RX ADMIN — TAMSULOSIN HYDROCHLORIDE 0.4 MILLIGRAM(S): 0.4 CAPSULE ORAL at 21:01

## 2019-02-09 RX ADMIN — OXYCODONE HYDROCHLORIDE 10 MILLIGRAM(S): 5 TABLET ORAL at 06:01

## 2019-02-09 RX ADMIN — ENOXAPARIN SODIUM 110 MILLIGRAM(S): 100 INJECTION SUBCUTANEOUS at 17:54

## 2019-02-09 RX ADMIN — OXYCODONE HYDROCHLORIDE 10 MILLIGRAM(S): 5 TABLET ORAL at 11:12

## 2019-02-09 RX ADMIN — Medication 5 MILLIGRAM(S): at 21:01

## 2019-02-09 RX ADMIN — SENNA PLUS 2 TABLET(S): 8.6 TABLET ORAL at 11:11

## 2019-02-09 RX ADMIN — PANTOPRAZOLE SODIUM 40 MILLIGRAM(S): 20 TABLET, DELAYED RELEASE ORAL at 06:04

## 2019-02-09 RX ADMIN — Medication 100 MILLIGRAM(S): at 05:50

## 2019-02-09 RX ADMIN — OXYCODONE HYDROCHLORIDE 10 MILLIGRAM(S): 5 TABLET ORAL at 17:19

## 2019-02-09 RX ADMIN — OXYCODONE HYDROCHLORIDE 10 MILLIGRAM(S): 5 TABLET ORAL at 05:51

## 2019-02-09 RX ADMIN — POLYETHYLENE GLYCOL 3350 17 GRAM(S): 17 POWDER, FOR SOLUTION ORAL at 11:10

## 2019-02-09 RX ADMIN — ENOXAPARIN SODIUM 110 MILLIGRAM(S): 100 INJECTION SUBCUTANEOUS at 05:52

## 2019-02-09 RX ADMIN — Medication 5 MILLIGRAM(S): at 05:51

## 2019-02-09 RX ADMIN — OXYCODONE HYDROCHLORIDE 10 MILLIGRAM(S): 5 TABLET ORAL at 10:52

## 2019-02-09 RX ADMIN — Medication 2 MILLIGRAM(S): at 05:51

## 2019-02-09 RX ADMIN — OXYCODONE HYDROCHLORIDE 10 MILLIGRAM(S): 5 TABLET ORAL at 21:01

## 2019-02-10 LAB
C DIFF BY PCR RESULT: NEGATIVE — SIGNIFICANT CHANGE UP
C DIFF TOX GENS STL QL NAA+PROBE: SIGNIFICANT CHANGE UP

## 2019-02-10 RX ADMIN — OXYCODONE HYDROCHLORIDE 10 MILLIGRAM(S): 5 TABLET ORAL at 18:09

## 2019-02-10 RX ADMIN — Medication 5 MILLIGRAM(S): at 21:42

## 2019-02-10 RX ADMIN — OXYCODONE HYDROCHLORIDE 10 MILLIGRAM(S): 5 TABLET ORAL at 18:03

## 2019-02-10 RX ADMIN — OXYCODONE HYDROCHLORIDE 10 MILLIGRAM(S): 5 TABLET ORAL at 04:34

## 2019-02-10 RX ADMIN — Medication 5 MILLIGRAM(S): at 13:20

## 2019-02-10 RX ADMIN — OXYCODONE HYDROCHLORIDE 10 MILLIGRAM(S): 5 TABLET ORAL at 22:53

## 2019-02-10 RX ADMIN — OXYCODONE HYDROCHLORIDE 5 MILLIGRAM(S): 5 TABLET ORAL at 13:22

## 2019-02-10 RX ADMIN — PANTOPRAZOLE SODIUM 40 MILLIGRAM(S): 20 TABLET, DELAYED RELEASE ORAL at 06:03

## 2019-02-10 RX ADMIN — TAMSULOSIN HYDROCHLORIDE 0.4 MILLIGRAM(S): 0.4 CAPSULE ORAL at 21:42

## 2019-02-10 RX ADMIN — OXYCODONE HYDROCHLORIDE 10 MILLIGRAM(S): 5 TABLET ORAL at 00:24

## 2019-02-10 RX ADMIN — OXYCODONE HYDROCHLORIDE 20 MILLIGRAM(S): 5 TABLET ORAL at 09:10

## 2019-02-10 RX ADMIN — Medication 5 MILLIGRAM(S): at 06:03

## 2019-02-10 RX ADMIN — Medication 2 MILLIGRAM(S): at 06:03

## 2019-02-10 RX ADMIN — OXYCODONE HYDROCHLORIDE 5 MILLIGRAM(S): 5 TABLET ORAL at 12:51

## 2019-02-10 RX ADMIN — ENOXAPARIN SODIUM 110 MILLIGRAM(S): 100 INJECTION SUBCUTANEOUS at 18:07

## 2019-02-10 RX ADMIN — Medication 5 MILLIGRAM(S): at 00:24

## 2019-02-10 RX ADMIN — OXYCODONE HYDROCHLORIDE 20 MILLIGRAM(S): 5 TABLET ORAL at 08:20

## 2019-02-10 RX ADMIN — OXYCODONE HYDROCHLORIDE 10 MILLIGRAM(S): 5 TABLET ORAL at 04:33

## 2019-02-10 RX ADMIN — Medication 81 MILLIGRAM(S): at 12:50

## 2019-02-10 RX ADMIN — ENOXAPARIN SODIUM 110 MILLIGRAM(S): 100 INJECTION SUBCUTANEOUS at 06:03

## 2019-02-11 LAB
ALBUMIN SERPL ELPH-MCNC: 3 G/DL — LOW (ref 3.5–5.2)
ALP SERPL-CCNC: 80 U/L — SIGNIFICANT CHANGE UP (ref 30–115)
ALT FLD-CCNC: 164 U/L — HIGH (ref 0–41)
ANION GAP SERPL CALC-SCNC: 15 MMOL/L — HIGH (ref 7–14)
AST SERPL-CCNC: 45 U/L — HIGH (ref 0–41)
BASOPHILS # BLD AUTO: 0.01 K/UL — SIGNIFICANT CHANGE UP (ref 0–0.2)
BASOPHILS NFR BLD AUTO: 0.1 % — SIGNIFICANT CHANGE UP (ref 0–1)
BILIRUB SERPL-MCNC: 0.6 MG/DL — SIGNIFICANT CHANGE UP (ref 0.2–1.2)
BUN SERPL-MCNC: 15 MG/DL — SIGNIFICANT CHANGE UP (ref 10–20)
CALCIUM SERPL-MCNC: 8.2 MG/DL — LOW (ref 8.5–10.1)
CHLORIDE SERPL-SCNC: 91 MMOL/L — LOW (ref 98–110)
CO2 SERPL-SCNC: 25 MMOL/L — SIGNIFICANT CHANGE UP (ref 17–32)
CREAT SERPL-MCNC: 0.5 MG/DL — LOW (ref 0.7–1.5)
EOSINOPHIL # BLD AUTO: 0.11 K/UL — SIGNIFICANT CHANGE UP (ref 0–0.7)
EOSINOPHIL NFR BLD AUTO: 1 % — SIGNIFICANT CHANGE UP (ref 0–8)
GLUCOSE SERPL-MCNC: 102 MG/DL — HIGH (ref 70–99)
HCT VFR BLD CALC: 29.1 % — LOW (ref 42–52)
HGB BLD-MCNC: 9.6 G/DL — LOW (ref 14–18)
IMM GRANULOCYTES NFR BLD AUTO: 1.2 % — HIGH (ref 0.1–0.3)
LYMPHOCYTES # BLD AUTO: 1.55 K/UL — SIGNIFICANT CHANGE UP (ref 1.2–3.4)
LYMPHOCYTES # BLD AUTO: 14.1 % — LOW (ref 20.5–51.1)
MAGNESIUM SERPL-MCNC: 2 MG/DL — SIGNIFICANT CHANGE UP (ref 1.8–2.4)
MCHC RBC-ENTMCNC: 27.3 PG — SIGNIFICANT CHANGE UP (ref 27–31)
MCHC RBC-ENTMCNC: 33 G/DL — SIGNIFICANT CHANGE UP (ref 32–37)
MCV RBC AUTO: 82.7 FL — SIGNIFICANT CHANGE UP (ref 80–94)
MONOCYTES # BLD AUTO: 0.79 K/UL — HIGH (ref 0.1–0.6)
MONOCYTES NFR BLD AUTO: 7.2 % — SIGNIFICANT CHANGE UP (ref 1.7–9.3)
NEUTROPHILS # BLD AUTO: 8.38 K/UL — HIGH (ref 1.4–6.5)
NEUTROPHILS NFR BLD AUTO: 76.4 % — HIGH (ref 42.2–75.2)
PHOSPHATE SERPL-MCNC: 3.3 MG/DL — SIGNIFICANT CHANGE UP (ref 2.1–4.9)
PLATELET # BLD AUTO: 235 K/UL — SIGNIFICANT CHANGE UP (ref 130–400)
POTASSIUM SERPL-MCNC: 4.1 MMOL/L — SIGNIFICANT CHANGE UP (ref 3.5–5)
POTASSIUM SERPL-SCNC: 4.1 MMOL/L — SIGNIFICANT CHANGE UP (ref 3.5–5)
PROT SERPL-MCNC: 5.2 G/DL — LOW (ref 6–8)
RBC # BLD: 3.52 M/UL — LOW (ref 4.7–6.1)
RBC # FLD: 13.9 % — SIGNIFICANT CHANGE UP (ref 11.5–14.5)
SODIUM SERPL-SCNC: 131 MMOL/L — LOW (ref 135–146)
WBC # BLD: 10.97 K/UL — HIGH (ref 4.8–10.8)
WBC # FLD AUTO: 10.97 K/UL — HIGH (ref 4.8–10.8)

## 2019-02-11 RX ORDER — DEXAMETHASONE 0.5 MG/5ML
6 ELIXIR ORAL EVERY 6 HOURS
Qty: 0 | Refills: 0 | Status: DISCONTINUED | OUTPATIENT
Start: 2019-02-11 | End: 2019-02-12

## 2019-02-11 RX ORDER — PANTOPRAZOLE SODIUM 20 MG/1
1 TABLET, DELAYED RELEASE ORAL
Qty: 0 | Refills: 0 | DISCHARGE
Start: 2019-02-11

## 2019-02-11 RX ORDER — DEXAMETHASONE 0.5 MG/5ML
6 ELIXIR ORAL
Qty: 0 | Refills: 0 | DISCHARGE
Start: 2019-02-11

## 2019-02-11 RX ORDER — OXYCODONE HYDROCHLORIDE 5 MG/1
5 TABLET ORAL EVERY 4 HOURS
Qty: 0 | Refills: 0 | Status: DISCONTINUED | OUTPATIENT
Start: 2019-02-11 | End: 2019-02-12

## 2019-02-11 RX ORDER — TAMSULOSIN HYDROCHLORIDE 0.4 MG/1
1 CAPSULE ORAL
Qty: 0 | Refills: 0 | DISCHARGE
Start: 2019-02-11

## 2019-02-11 RX ORDER — SODIUM CHLORIDE 9 MG/ML
1000 INJECTION, SOLUTION INTRAVENOUS
Refills: 0 | Status: DISCONTINUED | OUTPATIENT
Start: 2019-02-11 | End: 2019-02-12

## 2019-02-11 RX ORDER — DEXAMETHASONE 0.5 MG/5ML
4 ELIXIR ORAL EVERY 6 HOURS
Qty: 0 | Refills: 0 | Status: DISCONTINUED | OUTPATIENT
Start: 2019-02-11 | End: 2019-02-11

## 2019-02-11 RX ORDER — LANOLIN ALCOHOL/MO/W.PET/CERES
1 CREAM (GRAM) TOPICAL
Qty: 0 | Refills: 0 | DISCHARGE
Start: 2019-02-11

## 2019-02-11 RX ORDER — POLYETHYLENE GLYCOL 3350 17 G/17G
17 POWDER, FOR SOLUTION ORAL
Qty: 0 | Refills: 0 | DISCHARGE
Start: 2019-02-11

## 2019-02-11 RX ORDER — SENNA PLUS 8.6 MG/1
2 TABLET ORAL
Qty: 0 | Refills: 0 | DISCHARGE
Start: 2019-02-11

## 2019-02-11 RX ORDER — DEXAMETHASONE 0.5 MG/5ML
6 ELIXIR ORAL ONCE
Qty: 0 | Refills: 0 | Status: DISCONTINUED | OUTPATIENT
Start: 2019-02-11 | End: 2019-02-11

## 2019-02-11 RX ORDER — BACLOFEN 100 %
1 POWDER (GRAM) MISCELLANEOUS
Qty: 0 | Refills: 0 | DISCHARGE
Start: 2019-02-11

## 2019-02-11 RX ORDER — ASPIRIN/CALCIUM CARB/MAGNESIUM 324 MG
1 TABLET ORAL
Qty: 0 | Refills: 0 | DISCHARGE
Start: 2019-02-11

## 2019-02-11 RX ADMIN — OXYCODONE HYDROCHLORIDE 10 MILLIGRAM(S): 5 TABLET ORAL at 20:54

## 2019-02-11 RX ADMIN — OXYCODONE HYDROCHLORIDE 10 MILLIGRAM(S): 5 TABLET ORAL at 03:58

## 2019-02-11 RX ADMIN — OXYCODONE HYDROCHLORIDE 10 MILLIGRAM(S): 5 TABLET ORAL at 16:55

## 2019-02-11 RX ADMIN — Medication 5 MILLIGRAM(S): at 21:00

## 2019-02-11 RX ADMIN — OXYCODONE HYDROCHLORIDE 20 MILLIGRAM(S): 5 TABLET ORAL at 09:36

## 2019-02-11 RX ADMIN — OXYCODONE HYDROCHLORIDE 10 MILLIGRAM(S): 5 TABLET ORAL at 12:23

## 2019-02-11 RX ADMIN — Medication 5 MILLIGRAM(S): at 05:22

## 2019-02-11 RX ADMIN — Medication 6 MILLIGRAM(S): at 12:23

## 2019-02-11 RX ADMIN — ENOXAPARIN SODIUM 110 MILLIGRAM(S): 100 INJECTION SUBCUTANEOUS at 05:22

## 2019-02-11 RX ADMIN — TAMSULOSIN HYDROCHLORIDE 0.4 MILLIGRAM(S): 0.4 CAPSULE ORAL at 21:02

## 2019-02-11 RX ADMIN — Medication 6 MILLIGRAM(S): at 18:26

## 2019-02-11 RX ADMIN — SODIUM CHLORIDE 75 MILLILITER(S): 9 INJECTION, SOLUTION INTRAVENOUS at 18:26

## 2019-02-11 RX ADMIN — PANTOPRAZOLE SODIUM 40 MILLIGRAM(S): 20 TABLET, DELAYED RELEASE ORAL at 05:19

## 2019-02-11 RX ADMIN — OXYCODONE HYDROCHLORIDE 10 MILLIGRAM(S): 5 TABLET ORAL at 13:40

## 2019-02-11 RX ADMIN — OXYCODONE HYDROCHLORIDE 10 MILLIGRAM(S): 5 TABLET ORAL at 17:56

## 2019-02-11 RX ADMIN — Medication 81 MILLIGRAM(S): at 12:22

## 2019-02-11 RX ADMIN — OXYCODONE HYDROCHLORIDE 10 MILLIGRAM(S): 5 TABLET ORAL at 21:30

## 2019-02-11 RX ADMIN — Medication 5 MILLIGRAM(S): at 12:22

## 2019-02-11 RX ADMIN — OXYCODONE HYDROCHLORIDE 20 MILLIGRAM(S): 5 TABLET ORAL at 08:38

## 2019-02-12 RX ADMIN — Medication 6 MILLIGRAM(S): at 01:00

## 2019-02-12 RX ADMIN — OXYCODONE HYDROCHLORIDE 10 MILLIGRAM(S): 5 TABLET ORAL at 01:14

## 2019-02-12 RX ADMIN — OXYCODONE HYDROCHLORIDE 10 MILLIGRAM(S): 5 TABLET ORAL at 06:03

## 2019-02-12 RX ADMIN — Medication 5 MILLIGRAM(S): at 05:22

## 2019-02-12 RX ADMIN — Medication 6 MILLIGRAM(S): at 05:54

## 2019-02-12 RX ADMIN — OXYCODONE HYDROCHLORIDE 10 MILLIGRAM(S): 5 TABLET ORAL at 05:26

## 2019-02-15 LAB — S TYPHI H D AB SER QL: ABNORMAL

## 2019-02-25 DIAGNOSIS — R71.0 PRECIPITOUS DROP IN HEMATOCRIT: ICD-10-CM

## 2019-02-25 DIAGNOSIS — M96.840 POSTPROCEDURAL HEMATOMA OF A MUSCULOSKELETAL STRUCTURE FOLLOWING A MUSCULOSKELETAL SYSTEM PROCEDURE: ICD-10-CM

## 2019-02-25 DIAGNOSIS — Z47.89 ENCOUNTER FOR OTHER ORTHOPEDIC AFTERCARE: ICD-10-CM

## 2019-02-25 DIAGNOSIS — E66.9 OBESITY, UNSPECIFIED: ICD-10-CM

## 2019-02-25 DIAGNOSIS — K59.00 CONSTIPATION, UNSPECIFIED: ICD-10-CM

## 2019-02-25 DIAGNOSIS — Z98.1 ARTHRODESIS STATUS: ICD-10-CM

## 2019-02-25 DIAGNOSIS — G82.22 PARAPLEGIA, INCOMPLETE: ICD-10-CM

## 2019-02-25 DIAGNOSIS — N31.9 NEUROMUSCULAR DYSFUNCTION OF BLADDER, UNSPECIFIED: ICD-10-CM

## 2019-02-25 DIAGNOSIS — I10 ESSENTIAL (PRIMARY) HYPERTENSION: ICD-10-CM

## 2019-02-25 DIAGNOSIS — Z86.718 PERSONAL HISTORY OF OTHER VENOUS THROMBOSIS AND EMBOLISM: ICD-10-CM

## 2019-02-25 DIAGNOSIS — R33.9 RETENTION OF URINE, UNSPECIFIED: ICD-10-CM
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
## 2019-02-25 DIAGNOSIS — J44.9 CHRONIC OBSTRUCTIVE PULMONARY DISEASE, UNSPECIFIED: ICD-10-CM

## 2019-02-25 DIAGNOSIS — I25.2 OLD MYOCARDIAL INFARCTION: ICD-10-CM

## 2019-02-25 DIAGNOSIS — I25.10 ATHEROSCLEROTIC HEART DISEASE OF NATIVE CORONARY ARTERY WITHOUT ANGINA PECTORIS: ICD-10-CM

## 2019-02-25 DIAGNOSIS — E78.5 HYPERLIPIDEMIA, UNSPECIFIED: ICD-10-CM

## 2019-02-25 DIAGNOSIS — Z95.5 PRESENCE OF CORONARY ANGIOPLASTY IMPLANT AND GRAFT: ICD-10-CM

## 2019-02-25 DIAGNOSIS — G95.20 UNSPECIFIED CORD COMPRESSION: ICD-10-CM
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
## 2019-02-25 DIAGNOSIS — Z79.01 LONG TERM (CURRENT) USE OF ANTICOAGULANTS: ICD-10-CM

## 2019-04-04 ENCOUNTER — OUTPATIENT (OUTPATIENT)
Dept: OUTPATIENT SERVICES | Facility: HOSPITAL | Age: 64
LOS: 1 days | Discharge: HOME | End: 2019-04-04

## 2019-04-04 DIAGNOSIS — D64.9 ANEMIA, UNSPECIFIED: ICD-10-CM

## 2019-04-04 DIAGNOSIS — Z98.890 OTHER SPECIFIED POSTPROCEDURAL STATES: Chronic | ICD-10-CM

## 2019-04-05 ENCOUNTER — OUTPATIENT (OUTPATIENT)
Dept: OUTPATIENT SERVICES | Facility: HOSPITAL | Age: 64
LOS: 1 days | Discharge: HOME | End: 2019-04-05

## 2019-04-05 DIAGNOSIS — Z98.890 OTHER SPECIFIED POSTPROCEDURAL STATES: Chronic | ICD-10-CM

## 2019-04-05 DIAGNOSIS — N39.0 URINARY TRACT INFECTION, SITE NOT SPECIFIED: ICD-10-CM

## 2019-04-15 ENCOUNTER — OUTPATIENT (OUTPATIENT)
Dept: OUTPATIENT SERVICES | Facility: HOSPITAL | Age: 64
LOS: 1 days | Discharge: HOME | End: 2019-04-15

## 2019-04-15 DIAGNOSIS — L03.116 CELLULITIS OF LEFT LOWER LIMB: ICD-10-CM

## 2019-04-15 DIAGNOSIS — Z98.890 OTHER SPECIFIED POSTPROCEDURAL STATES: Chronic | ICD-10-CM

## 2019-05-10 ENCOUNTER — OUTPATIENT (OUTPATIENT)
Dept: OUTPATIENT SERVICES | Facility: HOSPITAL | Age: 64
LOS: 1 days | Discharge: HOME | End: 2019-05-10

## 2019-05-10 DIAGNOSIS — Z79.899 OTHER LONG TERM (CURRENT) DRUG THERAPY: ICD-10-CM

## 2019-05-10 DIAGNOSIS — Z98.890 OTHER SPECIFIED POSTPROCEDURAL STATES: Chronic | ICD-10-CM

## 2019-05-13 ENCOUNTER — OUTPATIENT (OUTPATIENT)
Dept: OUTPATIENT SERVICES | Facility: HOSPITAL | Age: 64
LOS: 1 days | Discharge: HOME | End: 2019-05-13

## 2019-05-13 DIAGNOSIS — T14.8XXA OTHER INJURY OF UNSPECIFIED BODY REGION, INITIAL ENCOUNTER: ICD-10-CM

## 2019-05-13 DIAGNOSIS — Z98.890 OTHER SPECIFIED POSTPROCEDURAL STATES: Chronic | ICD-10-CM

## 2019-05-20 ENCOUNTER — OUTPATIENT (OUTPATIENT)
Dept: OUTPATIENT SERVICES | Facility: HOSPITAL | Age: 64
LOS: 1 days | Discharge: HOME | End: 2019-05-20

## 2019-05-20 DIAGNOSIS — D64.9 ANEMIA, UNSPECIFIED: ICD-10-CM

## 2019-05-20 DIAGNOSIS — R10.9 UNSPECIFIED ABDOMINAL PAIN: ICD-10-CM

## 2019-05-20 DIAGNOSIS — Z98.890 OTHER SPECIFIED POSTPROCEDURAL STATES: Chronic | ICD-10-CM

## 2019-05-21 ENCOUNTER — OUTPATIENT (OUTPATIENT)
Dept: OUTPATIENT SERVICES | Facility: HOSPITAL | Age: 64
LOS: 1 days | Discharge: HOME | End: 2019-05-21

## 2019-05-21 DIAGNOSIS — Z98.890 OTHER SPECIFIED POSTPROCEDURAL STATES: Chronic | ICD-10-CM

## 2019-05-21 DIAGNOSIS — A49.02 METHICILLIN RESISTANT STAPHYLOCOCCUS AUREUS INFECTION, UNSPECIFIED SITE: ICD-10-CM

## 2019-05-23 ENCOUNTER — OUTPATIENT (OUTPATIENT)
Dept: OUTPATIENT SERVICES | Facility: HOSPITAL | Age: 64
LOS: 1 days | Discharge: HOME | End: 2019-05-23

## 2019-05-23 DIAGNOSIS — Z98.890 OTHER SPECIFIED POSTPROCEDURAL STATES: Chronic | ICD-10-CM

## 2019-05-23 DIAGNOSIS — A49.02 METHICILLIN RESISTANT STAPHYLOCOCCUS AUREUS INFECTION, UNSPECIFIED SITE: ICD-10-CM

## 2019-05-27 ENCOUNTER — OUTPATIENT (OUTPATIENT)
Dept: OUTPATIENT SERVICES | Facility: HOSPITAL | Age: 64
LOS: 1 days | Discharge: HOME | End: 2019-05-27

## 2019-05-27 DIAGNOSIS — Z98.890 OTHER SPECIFIED POSTPROCEDURAL STATES: Chronic | ICD-10-CM

## 2019-05-27 DIAGNOSIS — A49.02 METHICILLIN RESISTANT STAPHYLOCOCCUS AUREUS INFECTION, UNSPECIFIED SITE: ICD-10-CM

## 2019-06-13 ENCOUNTER — OUTPATIENT (OUTPATIENT)
Dept: OUTPATIENT SERVICES | Facility: HOSPITAL | Age: 64
LOS: 1 days | Discharge: HOME | End: 2019-06-13

## 2019-06-13 DIAGNOSIS — Z98.890 OTHER SPECIFIED POSTPROCEDURAL STATES: Chronic | ICD-10-CM

## 2019-06-13 DIAGNOSIS — A49.02 METHICILLIN RESISTANT STAPHYLOCOCCUS AUREUS INFECTION, UNSPECIFIED SITE: ICD-10-CM

## 2019-06-14 ENCOUNTER — OUTPATIENT (OUTPATIENT)
Dept: OUTPATIENT SERVICES | Facility: HOSPITAL | Age: 64
LOS: 1 days | Discharge: HOME | End: 2019-06-14

## 2019-06-14 DIAGNOSIS — Z98.890 OTHER SPECIFIED POSTPROCEDURAL STATES: Chronic | ICD-10-CM

## 2019-06-15 DIAGNOSIS — Z51.81 ENCOUNTER FOR THERAPEUTIC DRUG LEVEL MONITORING: ICD-10-CM

## 2019-06-17 ENCOUNTER — OUTPATIENT (OUTPATIENT)
Dept: OUTPATIENT SERVICES | Facility: HOSPITAL | Age: 64
LOS: 1 days | Discharge: HOME | End: 2019-06-17

## 2019-06-17 DIAGNOSIS — Z98.890 OTHER SPECIFIED POSTPROCEDURAL STATES: Chronic | ICD-10-CM

## 2019-06-18 DIAGNOSIS — R79.9 ABNORMAL FINDING OF BLOOD CHEMISTRY, UNSPECIFIED: ICD-10-CM

## 2019-06-19 ENCOUNTER — OUTPATIENT (OUTPATIENT)
Dept: OUTPATIENT SERVICES | Facility: HOSPITAL | Age: 64
LOS: 1 days | Discharge: HOME | End: 2019-06-19

## 2019-06-19 DIAGNOSIS — Z98.890 OTHER SPECIFIED POSTPROCEDURAL STATES: Chronic | ICD-10-CM

## 2019-06-19 DIAGNOSIS — Z79.2 LONG TERM (CURRENT) USE OF ANTIBIOTICS: ICD-10-CM

## 2019-06-24 ENCOUNTER — OUTPATIENT (OUTPATIENT)
Dept: OUTPATIENT SERVICES | Facility: HOSPITAL | Age: 64
LOS: 1 days | Discharge: HOME | End: 2019-06-24

## 2019-06-24 DIAGNOSIS — Z98.890 OTHER SPECIFIED POSTPROCEDURAL STATES: Chronic | ICD-10-CM

## 2019-06-24 DIAGNOSIS — T81.11XD: ICD-10-CM

## 2019-06-25 ENCOUNTER — OUTPATIENT (OUTPATIENT)
Dept: OUTPATIENT SERVICES | Facility: HOSPITAL | Age: 64
LOS: 1 days | Discharge: HOME | End: 2019-06-25

## 2019-06-25 DIAGNOSIS — Z79.2 LONG TERM (CURRENT) USE OF ANTIBIOTICS: ICD-10-CM

## 2019-06-25 DIAGNOSIS — Z98.890 OTHER SPECIFIED POSTPROCEDURAL STATES: Chronic | ICD-10-CM

## 2019-06-26 ENCOUNTER — OUTPATIENT (OUTPATIENT)
Dept: OUTPATIENT SERVICES | Facility: HOSPITAL | Age: 64
LOS: 1 days | Discharge: HOME | End: 2019-06-26

## 2019-06-26 DIAGNOSIS — Z98.890 OTHER SPECIFIED POSTPROCEDURAL STATES: Chronic | ICD-10-CM

## 2019-06-26 DIAGNOSIS — N39.0 URINARY TRACT INFECTION, SITE NOT SPECIFIED: ICD-10-CM

## 2019-06-26 DIAGNOSIS — A49.02 METHICILLIN RESISTANT STAPHYLOCOCCUS AUREUS INFECTION, UNSPECIFIED SITE: ICD-10-CM

## 2020-11-24 NOTE — ED ADULT NURSE NOTE - TEMPLATE
Addended by: ALBERTO ELLIS on: 11/24/2020 01:39 PM     Modules accepted: Orders     Abdominal Pain, N/V/D

## 2021-04-10 NOTE — ED ADULT NURSE NOTE - TEMPLATE LIST FOR HEAD TO TOE ASSESSMENT
Assessment complete, see flow sheet. /81   Pulse 84   Temp 98.5 °F (36.9 °C) (Oral)   Resp 18   Ht 5' 11\" (1.803 m)   Wt 180 lb (81.6 kg)   SpO2 97%   BMI 25.10 kg/m²  room air. Denies pain, no complaints voiced. Will continue to monitor. Tyree Elena RN, BSN, PCCN.  Symptoms

## 2023-09-13 NOTE — H&P PST ADULT - PAIN, FACTORS THAT RELIEVE, PROFILE
rest Intermediate Repair And Flap Additional Text (Will Appearing After The Standard Complex Repair Text): The intermediate repair was not sufficient to completely close the primary defect. The remaining additional defect was repaired with the flap mentioned below.

## 2024-04-08 NOTE — ED ADULT NURSE NOTE - ED COMFORT CARE
Alert-The patient is alert, awake and responds to voice. The patient is oriented to time, place, and person. The triage nurse is able to obtain subjective information. Patient informed